# Patient Record
Sex: FEMALE | Race: BLACK OR AFRICAN AMERICAN | NOT HISPANIC OR LATINO | Employment: OTHER | URBAN - METROPOLITAN AREA
[De-identification: names, ages, dates, MRNs, and addresses within clinical notes are randomized per-mention and may not be internally consistent; named-entity substitution may affect disease eponyms.]

---

## 2023-11-16 PROBLEM — I10 ESSENTIAL HYPERTENSION: Status: ACTIVE | Noted: 2023-11-16

## 2023-11-16 PROBLEM — F32.A DEPRESSION: Status: ACTIVE | Noted: 2023-11-16

## 2023-11-16 PROBLEM — M79.673 FOOT PAIN: Status: ACTIVE | Noted: 2023-11-16

## 2023-11-16 PROBLEM — R10.9 ABDOMINAL PAIN: Status: ACTIVE | Noted: 2023-11-16

## 2023-11-16 PROBLEM — I50.9 CONGESTIVE HEART FAILURE: Status: ACTIVE | Noted: 2023-11-16

## 2023-11-16 PROBLEM — M79.10 MUSCLE PAIN: Status: ACTIVE | Noted: 2023-11-16

## 2024-01-11 ENCOUNTER — OFFICE VISIT (OUTPATIENT)
Dept: BARIATRICS/WEIGHT MGMT | Facility: CLINIC | Age: 54
End: 2024-01-11
Payer: MEDICAID

## 2024-01-11 VITALS
HEIGHT: 63 IN | SYSTOLIC BLOOD PRESSURE: 137 MMHG | TEMPERATURE: 97.3 F | BODY MASS INDEX: 51.91 KG/M2 | WEIGHT: 293 LBS | HEART RATE: 88 BPM | DIASTOLIC BLOOD PRESSURE: 91 MMHG

## 2024-01-11 DIAGNOSIS — R13.19 OTHER DYSPHAGIA: ICD-10-CM

## 2024-01-11 DIAGNOSIS — I50.20 SYSTOLIC CONGESTIVE HEART FAILURE, UNSPECIFIED HF CHRONICITY: ICD-10-CM

## 2024-01-11 DIAGNOSIS — I25.10 CORONARY ARTERY DISEASE INVOLVING NATIVE CORONARY ARTERY OF NATIVE HEART WITHOUT ANGINA PECTORIS: ICD-10-CM

## 2024-01-11 DIAGNOSIS — E11.69 DIABETES MELLITUS TYPE 2 IN OBESE: ICD-10-CM

## 2024-01-11 DIAGNOSIS — R11.10 REGURGITATION OF FOOD: Primary | ICD-10-CM

## 2024-01-11 DIAGNOSIS — E66.01 CLASS 3 SEVERE OBESITY DUE TO EXCESS CALORIES WITH SERIOUS COMORBIDITY AND BODY MASS INDEX (BMI) OF 60.0 TO 69.9 IN ADULT: ICD-10-CM

## 2024-01-11 DIAGNOSIS — I10 ESSENTIAL HYPERTENSION: ICD-10-CM

## 2024-01-11 DIAGNOSIS — Z98.84 HISTORY OF ADJUSTABLE GASTRIC BANDING: ICD-10-CM

## 2024-01-11 DIAGNOSIS — J43.8 OTHER EMPHYSEMA: ICD-10-CM

## 2024-01-11 DIAGNOSIS — E66.9 DIABETES MELLITUS TYPE 2 IN OBESE: ICD-10-CM

## 2024-01-11 DIAGNOSIS — K21.9 GASTROESOPHAGEAL REFLUX DISEASE, UNSPECIFIED WHETHER ESOPHAGITIS PRESENT: ICD-10-CM

## 2024-01-11 RX ORDER — HYDROCODONE BITARTRATE AND ACETAMINOPHEN 10; 325 MG/1; MG/1
1 TABLET ORAL
COMMUNITY
Start: 2024-01-04

## 2024-01-11 RX ORDER — BUMETANIDE 2 MG/1
4 TABLET ORAL DAILY
COMMUNITY
Start: 2023-04-12 | End: 2024-09-19

## 2024-01-11 RX ORDER — ALBUTEROL SULFATE 2.5 MG/3ML
2.5 SOLUTION RESPIRATORY (INHALATION) EVERY 4 HOURS PRN
COMMUNITY

## 2024-01-11 RX ORDER — INSULIN GLARGINE 100 [IU]/ML
45 INJECTION, SOLUTION SUBCUTANEOUS NIGHTLY
COMMUNITY

## 2024-01-11 RX ORDER — LOSARTAN POTASSIUM 100 MG/1
100 TABLET ORAL EVERY 24 HOURS
COMMUNITY

## 2024-01-11 RX ORDER — SODIUM CHLORIDE, SODIUM LACTATE, POTASSIUM CHLORIDE, CALCIUM CHLORIDE 600; 310; 30; 20 MG/100ML; MG/100ML; MG/100ML; MG/100ML
30 INJECTION, SOLUTION INTRAVENOUS CONTINUOUS
OUTPATIENT
Start: 2024-01-11

## 2024-01-11 NOTE — H&P
[facility] BARIATRIC SURGERY  [address] [facility] [address]  [facility] [address]  [phone]  [address] [address]  [phone]  Dept: [phone]  1/11/2024      Nicolasa Clemons.  [id number]  [id number]  1970  female      Chief Complaint   Patient presents with    Consult     LUANA - Band 2017         Post-Op Bariatric Surgery:   Nicolasa Clemons is status post Lapband procedure, performed on 2017 at [facility].     HPI:   Today's weight is (!) 167 kg (368 lb)  pounds, today's BMI is Body mass index is 65.2 kg/m².. The patient reports difficulty with solid foods.    Patient admits to nausea and dysphagia. The patient denies abdominal pain. The patientdoes have vomitng. The patientdoes have reflux.    Diet and Exercise: Diet history reviewed and discussed with the patient. Weight loss/gains to date discussed with the patient. She cannot tolerate solid protein.        The following portions of the patient's history were reviewed and updated as appropriate: allergies, current medications, past family history, past medical history, past social history, past surgical history, and problem list.    Vitals:    01/11/24 1443   BP: 137/91   Pulse: 88   Temp: 97.3 °F (36.3 °C)       Review of Systems   Constitutional:  Positive for fatigue.   Musculoskeletal:  Positive for arthralgias, back pain and gait problem.   Neurological:  Positive for weakness.   All other systems reviewed and are negative.      Physical Exam  Vitals reviewed.   HENT:      Head: Normocephalic and atraumatic.      Mouth/Throat:      Mouth: Mucous membranes are moist.      Pharynx: Oropharynx is clear.   Eyes:      General: No scleral icterus.     Extraocular Movements: Extraocular movements intact.      Conjunctiva/sclera: Conjunctivae normal.      Pupils: Pupils are equal, round, and reactive to light.   Neck:      Thyroid: No thyromegaly.   Cardiovascular:      Rate and  Rhythm: Normal rate.   Pulmonary:      Effort: Pulmonary effort is normal. No respiratory distress.      Breath sounds: Normal breath sounds. No stridor. No wheezing or rhonchi.   Abdominal:      General: Bowel sounds are normal.      Palpations: Abdomen is soft.      Tenderness: There is no abdominal tenderness. There is no right CVA tenderness, left CVA tenderness, guarding or rebound.      Hernia: No hernia is present.   Musculoskeletal:         General: Normal range of motion.      Cervical back: Normal range of motion and neck supple.   Lymphadenopathy:      Cervical: No cervical adenopathy.   Skin:     General: Skin is warm and dry.      Findings: No erythema.   Neurological:      Mental Status: She is alert and oriented to person, place, and time.      Motor: Weakness present.      Gait: Gait abnormal.   Psychiatric:         Mood and Affect: Mood normal.         Behavior: Behavior normal.         Thought Content: Thought content normal.         Judgment: Judgment normal.         Assessment: Post-operatively the patient status post lap band 2017 at [facility] where her greatest weight loss was around 60 pounds.  She has had trouble with the band over the past several years with regurgitating food, dysphagia with solids as well as heartburn.  She would like to have the band removed and interested in conversion to gastric bypass or gastric sleeve.  She has a history of smoking and states she quit about 3 years ago but states that she has smoked a few cigarettes in the past few months because of stress.  We went through all the different procedures and with her smoking history she feels like the gastric sleeve is the better procedure for her and she was concerned about vitamin deficiencies as well.  We will proceed with upper endoscopy to evaluate the band and proceed with insurance approval for band removal.  The risks and benefits of the procedure were discussed with the patient in detail and all questions  were answered.  Possibility of perforation, bleeding, aspiration, anoxic brain injury, respiratory and/or cardiac arrest and death were discussed.  Consent will be signed and witnessed.  She has a complicated cardiac history and plans on seeing her cardiologist on Monday and will get clearance for surgery.    Encounter Diagnoses   Name Primary?    Regurgitation of food Yes    Other dysphagia     Gastroesophageal reflux disease, unspecified whether esophagitis present     History of adjustable gastric banding     Essential hypertension     Diabetes mellitus type 2 in obese     Other emphysema     Coronary artery disease involving native coronary artery of native heart without angina pectoris     Systolic congestive heart failure, unspecified HF chronicity     Class 3 severe obesity due to excess calories with serious comorbidity and body mass index (BMI) of 60.0 to 69.9 in adult        Plan:    My impression is Nicolasa Clemons has a problem of her band as listed above.  I think she would benefit from band removal.  The risks and benefits of the procedure were discussed with the patient in detail and all questions were answered.  Possibility of open, bleeding, infection, bowel injury, deep venous thrombosis, pulmonary embolism, incisional hernias, renal failure, myocardial infarction, respiratory and cardiac arrest and death were discussed. Consent will be signed and witnessed.    Reviewed the importance of being able to tolerate dense/solid protein and vegetables for weight loss. Discussed eating foods that are easier to tolerate, softer foods, usually contribute to weight gain because they are higher calorie/carb and will not keep patient full for the recommended 3-4 hours.      She should follow-up after band removal.      EGD discussed with patient and all questions answered    Approximately 45minutes was spent reviewing records and spending time with the patient and over 30 minutes spent counseling.    Activity  restrictions: None.   Instructions / Recommendations: dietary counseling recommended, recommended a daily protein intake of  grams, patient was advised that the lap band system works best when consuming solid foods, vitamin supplement(s) recommended, recommended exercising at least 150 minutes per week, behavior modifications recommended and instructed to call the office for concerns, questions, or problems.

## 2024-01-11 NOTE — H&P (VIEW-ONLY)
[facility] BARIATRIC SURGERY  [address] [facility] [address]  [facility] [address]  [phone]  [address] [address]  [phone]  Dept: [phone]  1/11/2024      Nicolasa Clemons.  [id number]  [id number]  1970  female      Chief Complaint   Patient presents with    Consult     LUANA - Band 2017         Post-Op Bariatric Surgery:   Nicolasa Clemons is status post Lapband procedure, performed on 2017 at [facility].     HPI:   Today's weight is (!) 167 kg (368 lb)  pounds, today's BMI is Body mass index is 65.2 kg/m².. The patient reports difficulty with solid foods.    Patient admits to nausea and dysphagia. The patient denies abdominal pain. The patientdoes have vomitng. The patientdoes have reflux.    Diet and Exercise: Diet history reviewed and discussed with the patient. Weight loss/gains to date discussed with the patient. She cannot tolerate solid protein.        The following portions of the patient's history were reviewed and updated as appropriate: allergies, current medications, past family history, past medical history, past social history, past surgical history, and problem list.    Vitals:    01/11/24 1443   BP: 137/91   Pulse: 88   Temp: 97.3 °F (36.3 °C)       Review of Systems   Constitutional:  Positive for fatigue.   Musculoskeletal:  Positive for arthralgias, back pain and gait problem.   Neurological:  Positive for weakness.   All other systems reviewed and are negative.      Physical Exam  Vitals reviewed.   HENT:      Head: Normocephalic and atraumatic.      Mouth/Throat:      Mouth: Mucous membranes are moist.      Pharynx: Oropharynx is clear.   Eyes:      General: No scleral icterus.     Extraocular Movements: Extraocular movements intact.      Conjunctiva/sclera: Conjunctivae normal.      Pupils: Pupils are equal, round, and reactive to light.   Neck:      Thyroid: No thyromegaly.   Cardiovascular:      Rate and  Rhythm: Normal rate.   Pulmonary:      Effort: Pulmonary effort is normal. No respiratory distress.      Breath sounds: Normal breath sounds. No stridor. No wheezing or rhonchi.   Abdominal:      General: Bowel sounds are normal.      Palpations: Abdomen is soft.      Tenderness: There is no abdominal tenderness. There is no right CVA tenderness, left CVA tenderness, guarding or rebound.      Hernia: No hernia is present.   Musculoskeletal:         General: Normal range of motion.      Cervical back: Normal range of motion and neck supple.   Lymphadenopathy:      Cervical: No cervical adenopathy.   Skin:     General: Skin is warm and dry.      Findings: No erythema.   Neurological:      Mental Status: She is alert and oriented to person, place, and time.      Motor: Weakness present.      Gait: Gait abnormal.   Psychiatric:         Mood and Affect: Mood normal.         Behavior: Behavior normal.         Thought Content: Thought content normal.         Judgment: Judgment normal.         Assessment: Post-operatively the patient status post lap band 2017 at [facility] where her greatest weight loss was around 60 pounds.  She has had trouble with the band over the past several years with regurgitating food, dysphagia with solids as well as heartburn.  She would like to have the band removed and interested in conversion to gastric bypass or gastric sleeve.  She has a history of smoking and states she quit about 3 years ago but states that she has smoked a few cigarettes in the past few months because of stress.  We went through all the different procedures and with her smoking history she feels like the gastric sleeve is the better procedure for her and she was concerned about vitamin deficiencies as well.  We will proceed with upper endoscopy to evaluate the band and proceed with insurance approval for band removal.  The risks and benefits of the procedure were discussed with the patient in detail and all questions  were answered.  Possibility of perforation, bleeding, aspiration, anoxic brain injury, respiratory and/or cardiac arrest and death were discussed.  Consent will be signed and witnessed.  She has a complicated cardiac history and plans on seeing her cardiologist on Monday and will get clearance for surgery.    Encounter Diagnoses   Name Primary?    Regurgitation of food Yes    Other dysphagia     Gastroesophageal reflux disease, unspecified whether esophagitis present     History of adjustable gastric banding     Essential hypertension     Diabetes mellitus type 2 in obese     Other emphysema     Coronary artery disease involving native coronary artery of native heart without angina pectoris     Systolic congestive heart failure, unspecified HF chronicity     Class 3 severe obesity due to excess calories with serious comorbidity and body mass index (BMI) of 60.0 to 69.9 in adult        Plan:    My impression is Nicolasa Clemons has a problem of her band as listed above.  I think she would benefit from band removal.  The risks and benefits of the procedure were discussed with the patient in detail and all questions were answered.  Possibility of open, bleeding, infection, bowel injury, deep venous thrombosis, pulmonary embolism, incisional hernias, renal failure, myocardial infarction, respiratory and cardiac arrest and death were discussed. Consent will be signed and witnessed.    Reviewed the importance of being able to tolerate dense/solid protein and vegetables for weight loss. Discussed eating foods that are easier to tolerate, softer foods, usually contribute to weight gain because they are higher calorie/carb and will not keep patient full for the recommended 3-4 hours.      She should follow-up after band removal.      EGD discussed with patient and all questions answered    Approximately 45minutes was spent reviewing records and spending time with the patient and over 30 minutes spent counseling.    Activity  restrictions: None.   Instructions / Recommendations: dietary counseling recommended, recommended a daily protein intake of  grams, patient was advised that the lap band system works best when consuming solid foods, vitamin supplement(s) recommended, recommended exercising at least 150 minutes per week, behavior modifications recommended and instructed to call the office for concerns, questions, or problems.

## 2024-01-30 ENCOUNTER — ANESTHESIA EVENT (OUTPATIENT)
Dept: GASTROENTEROLOGY | Facility: HOSPITAL | Age: 54
End: 2024-01-30
Payer: MEDICAID

## 2024-01-30 ENCOUNTER — ANESTHESIA (OUTPATIENT)
Dept: GASTROENTEROLOGY | Facility: HOSPITAL | Age: 54
End: 2024-01-30
Payer: MEDICAID

## 2024-01-30 ENCOUNTER — HOSPITAL ENCOUNTER (OUTPATIENT)
Facility: HOSPITAL | Age: 54
Setting detail: HOSPITAL OUTPATIENT SURGERY
Discharge: HOME | End: 2024-01-30
Attending: SURGERY | Admitting: SURGERY
Payer: MEDICAID

## 2024-01-30 VITALS
BODY MASS INDEX: 51.91 KG/M2 | RESPIRATION RATE: 20 BRPM | DIASTOLIC BLOOD PRESSURE: 89 MMHG | WEIGHT: 293 LBS | HEART RATE: 82 BPM | OXYGEN SATURATION: 94 % | SYSTOLIC BLOOD PRESSURE: 137 MMHG | HEIGHT: 63 IN

## 2024-01-30 DIAGNOSIS — E11.69 DIABETES MELLITUS TYPE 2 IN OBESE: ICD-10-CM

## 2024-01-30 DIAGNOSIS — J43.8 OTHER EMPHYSEMA: ICD-10-CM

## 2024-01-30 DIAGNOSIS — R11.10 REGURGITATION OF FOOD: ICD-10-CM

## 2024-01-30 DIAGNOSIS — E66.9 DIABETES MELLITUS TYPE 2 IN OBESE: ICD-10-CM

## 2024-01-30 DIAGNOSIS — Z98.84 HISTORY OF ADJUSTABLE GASTRIC BANDING: ICD-10-CM

## 2024-01-30 DIAGNOSIS — K21.9 GASTROESOPHAGEAL REFLUX DISEASE, UNSPECIFIED WHETHER ESOPHAGITIS PRESENT: ICD-10-CM

## 2024-01-30 DIAGNOSIS — I10 ESSENTIAL HYPERTENSION: ICD-10-CM

## 2024-01-30 DIAGNOSIS — E66.01 CLASS 3 SEVERE OBESITY DUE TO EXCESS CALORIES WITH SERIOUS COMORBIDITY AND BODY MASS INDEX (BMI) OF 60.0 TO 69.9 IN ADULT: ICD-10-CM

## 2024-01-30 DIAGNOSIS — R13.19 OTHER DYSPHAGIA: ICD-10-CM

## 2024-01-30 DIAGNOSIS — I50.20 SYSTOLIC CONGESTIVE HEART FAILURE, UNSPECIFIED HF CHRONICITY: ICD-10-CM

## 2024-01-30 DIAGNOSIS — I25.10 CORONARY ARTERY DISEASE INVOLVING NATIVE CORONARY ARTERY OF NATIVE HEART WITHOUT ANGINA PECTORIS: ICD-10-CM

## 2024-01-30 PROBLEM — K29.00 ACUTE SUPERFICIAL GASTRITIS WITHOUT HEMORRHAGE: Status: ACTIVE | Noted: 2024-01-30

## 2024-01-30 PROBLEM — K95.09 GASTRIC BAND SLIPPAGE: Status: ACTIVE | Noted: 2024-01-30

## 2024-01-30 LAB — GLUCOSE BLDC GLUCOMTR-MCNC: 154 MG/DL (ref 70–130)

## 2024-01-30 PROCEDURE — 82948 REAGENT STRIP/BLOOD GLUCOSE: CPT

## 2024-01-30 PROCEDURE — 87081 CULTURE SCREEN ONLY: CPT | Performed by: SURGERY

## 2024-01-30 PROCEDURE — 25810000003 LACTATED RINGERS PER 1000 ML: Performed by: SURGERY

## 2024-01-30 PROCEDURE — 25010000002 PROPOFOL 10 MG/ML EMULSION: Performed by: NURSE ANESTHETIST, CERTIFIED REGISTERED

## 2024-01-30 RX ORDER — SODIUM CHLORIDE, SODIUM LACTATE, POTASSIUM CHLORIDE, CALCIUM CHLORIDE 600; 310; 30; 20 MG/100ML; MG/100ML; MG/100ML; MG/100ML
1000 INJECTION, SOLUTION INTRAVENOUS CONTINUOUS
Status: DISCONTINUED | OUTPATIENT
Start: 2024-01-30 | End: 2024-01-30 | Stop reason: HOSPADM

## 2024-01-30 RX ORDER — PANTOPRAZOLE SODIUM 40 MG/1
40 TABLET, DELAYED RELEASE ORAL DAILY
Qty: 30 TABLET | Refills: 5 | Status: SHIPPED | OUTPATIENT
Start: 2024-01-30

## 2024-01-30 RX ORDER — PROPOFOL 10 MG/ML
VIAL (ML) INTRAVENOUS AS NEEDED
Status: DISCONTINUED | OUTPATIENT
Start: 2024-01-30 | End: 2024-01-30 | Stop reason: SURG

## 2024-01-30 RX ORDER — SODIUM CHLORIDE, SODIUM LACTATE, POTASSIUM CHLORIDE, CALCIUM CHLORIDE 600; 310; 30; 20 MG/100ML; MG/100ML; MG/100ML; MG/100ML
30 INJECTION, SOLUTION INTRAVENOUS CONTINUOUS
Status: DISCONTINUED | OUTPATIENT
Start: 2024-01-30 | End: 2024-01-30 | Stop reason: HOSPADM

## 2024-01-30 RX ORDER — LIDOCAINE HYDROCHLORIDE 20 MG/ML
INJECTION, SOLUTION INFILTRATION; PERINEURAL AS NEEDED
Status: DISCONTINUED | OUTPATIENT
Start: 2024-01-30 | End: 2024-01-30 | Stop reason: SURG

## 2024-01-30 RX ORDER — SUCRALFATE 1 G/1
1 TABLET ORAL 3 TIMES DAILY
Qty: 90 TABLET | Refills: 1 | Status: SHIPPED | OUTPATIENT
Start: 2024-01-30

## 2024-01-30 RX ADMIN — LIDOCAINE HYDROCHLORIDE 60 MG: 20 INJECTION, SOLUTION INFILTRATION; PERINEURAL at 07:07

## 2024-01-30 RX ADMIN — SODIUM CHLORIDE, POTASSIUM CHLORIDE, SODIUM LACTATE AND CALCIUM CHLORIDE 1000 ML: 600; 310; 30; 20 INJECTION, SOLUTION INTRAVENOUS at 06:50

## 2024-01-30 RX ADMIN — PROPOFOL 100 MG: 10 INJECTION, EMULSION INTRAVENOUS at 07:07

## 2024-01-30 NOTE — DISCHARGE INSTRUCTIONS
For the next 24 hours patient needs to be with a responsible adult.    For 24 hours DO NOT drive, operate machinery, appliances, drink alcohol, make important decisions or sign legal documents.    Start with a light or bland diet if you are feeling sick to your stomach otherwise advance to regular diet as tolerated.    Follow recommendations on procedure report if provided by your doctor.    Call Dr Marte for problems [phone]    Stay away from NSAIDS    Problems may include but not limited to: large amounts of bleeding, trouble breathing, repeated vomiting, severe unrelieved pain, fever or chills.

## 2024-01-30 NOTE — OP NOTE
Surgeon: Jose Marte Jr., M.D.    Preoperative Diagnosis: #1 dysphagia #2 status post lap band    Postoperative Diagnosis: #1 gastritis #2 proximal band slip    Procedure Performed: Transoral esophagogastroduodenoscopy with gastric antral biopsies    Indications: 53-year-old female status post lap band at [facility] with complaints of dysphagia with solids.    Procedure:     The procedure, indications, preparation and potential complications were explained to the patient, who indicated understanding and signed the corresponding consent forms.  The patient was identified, taken to the endoscopy suite, and placed on the left side down decubitus position.  The patient underwent a MAC anesthesia and was appropriately monitored through the case by the anesthesia personnel with continuous pulse oximetry, blood pressure, and cardiac monitoring.  A bite block was placed.  After adequate IV sedation and using a transoral technique a lubed flexible endoscope was placed in the hypopharynx and advanced to the second portion of the duodenum without difficulty. The scope was then withdrawn back into the antrum of the stomach.  Cold forcep biopsies of the antrum were taken to rule out Helicobacter pylori.  The scope was retroflexed noting the body, fundus and cardia.  The scope was then withdrawn back into the esophagus after decompressing the stomach.  The Z line was noted and GE junction measured from the incisors.  The scope was then completely withdrawn.  The patient tolerated the procedure well and left the endoscopy suite in stable condition.  The findings are listed below.    Duodenum: Unremarkable  Antrum: Erythema throughout antrum  Body/Fundus: Patchy erythema.  Indentation of band noted without evidence of erosion  Cardia: Minimal gastric pouch consistent with proximal band slip  Esophagus: Z-line regular    Recommendations:     Will proceed with insurance approval for band removal.  Stop any NSAIDs and start on  PPI and Carafate

## 2024-01-30 NOTE — ANESTHESIA POSTPROCEDURE EVALUATION
Patient: Nicolasa Clemons    Procedure Summary       Date: 01/30/24 Room / Location:  [facility] /  [facility]    Anesthesia Start: 0701 Anesthesia Stop: 0722    Procedure: ESOPHAGOGASTRODUODENOSCOPY WITH BIOPSY (Esophagus) Diagnosis:       Regurgitation of food      Other dysphagia      Gastroesophageal reflux disease, unspecified whether esophagitis present      History of adjustable gastric banding      Essential hypertension      Diabetes mellitus type 2 in obese      Other emphysema      Coronary artery disease involving native coronary artery of native heart without angina pectoris      Systolic congestive heart failure, unspecified HF chronicity      Class 3 severe obesity due to excess calories with serious comorbidity and body mass index (BMI) of 60.0 to 69.9 in adult      (Regurgitation of food [R11.10])      (Other dysphagia [R13.19])      (Gastroesophageal reflux disease, unspecified whether esophagitis present [K21.9])      (History of adjustable gastric banding [Z98.84])      (Essential hypertension [I10])      (Diabetes mellitus type 2 in obese [E11.69, E66.9])      (Other emphysema [J43.8])      (Coronary artery disease involving native coronary artery of native heart without angina pectoris [I25.10])      (Systolic congestive heart failure, unspecified HF chronicity [I50.20])      (Class 3 severe obesity due to excess calories with serious comorbidity and body mass index (BMI) of 60.0 to 69.9 in adult [E66.01, Z68.44])    Surgeons: Jose Marte Jr., MD Provider: Jose Bull MD    Anesthesia Type: MAC ASA Status: 4            Anesthesia Type: MAC    Vitals  Vitals Value Taken Time   /99 01/30/24 0731   Temp     Pulse 83 01/30/24 0743   Resp 20 01/30/24 0731   SpO2 96 % 01/30/24 0743   Vitals shown include unfiled device data.        Post Anesthesia Care and Evaluation    Patient location during evaluation: PACU  Patient participation: complete - patient  participated  Level of consciousness: awake and alert  Pain management: adequate    Airway patency: patent  Anesthetic complications: No anesthetic complications    Cardiovascular status: acceptable  Respiratory status: acceptable  Hydration status: acceptable    Comments: --------------------            01/30/24               0731     --------------------   BP:       120/99     Pulse:      82       Resp:       20       SpO2:      93%      --------------------

## 2024-01-30 NOTE — ANESTHESIA PREPROCEDURE EVALUATION
Anesthesia Evaluation     Patient summary reviewed and Nursing notes reviewed                Airway   Mallampati: III  TM distance: >3 FB  Dental      Pulmonary - negative pulmonary ROS   (+) a smoker Former, COPD,home oxygen  Cardiovascular - negative cardio ROS    ECG reviewed  Patient on routine beta blocker  Rhythm: regular  Rate: normal    (+) hypertension, past MI , CAD, CHF       Neuro/Psych- negative ROS  (+) psychiatric history Anxiety and Depression  GI/Hepatic/Renal/Endo - negative ROS   (+) morbid obesity, GERD, diabetes mellitus type 2 using insulin    Musculoskeletal (-) negative ROS    Abdominal    Substance History - negative use     OB/GYN negative ob/gyn ROS         Other                    Anesthesia Plan    ASA 4     MAC     (I have reviewed the patient's history with the patient and the chart, including all pertinent laboratory results and imaging. I have explained the risks of anesthesia including but not limited to dental damage, corneal abrasion, nerve injury, MI, stroke, and death. Questions asked and answered. Anesthetic plan discussed with patient and team as indicated. Patient expressed understanding of the above.    )  intravenous induction     Anesthetic plan, risks, benefits, and alternatives have been provided, discussed and informed consent has been obtained with: patient.    Plan discussed with CRNA.    CODE STATUS:

## 2024-01-31 LAB — UREASE TISS QL: NEGATIVE

## 2024-04-06 ENCOUNTER — PREP FOR SURGERY (OUTPATIENT)
Dept: OTHER | Facility: HOSPITAL | Age: 54
End: 2024-04-06
Payer: MEDICAID

## 2024-04-06 DIAGNOSIS — K95.09 GASTRIC BAND MALFUNCTION: Primary | ICD-10-CM

## 2024-04-06 RX ORDER — PANTOPRAZOLE SODIUM 40 MG/10ML
40 INJECTION, POWDER, LYOPHILIZED, FOR SOLUTION INTRAVENOUS ONCE
OUTPATIENT
Start: 2024-04-06 | End: 2024-04-06

## 2024-04-06 RX ORDER — SODIUM CHLORIDE 9 MG/ML
40 INJECTION, SOLUTION INTRAVENOUS AS NEEDED
OUTPATIENT
Start: 2024-04-06

## 2024-04-06 RX ORDER — CHLORHEXIDINE GLUCONATE ORAL RINSE 1.2 MG/ML
15 SOLUTION DENTAL
OUTPATIENT
Start: 2024-04-06 | End: 2024-04-06

## 2024-04-06 RX ORDER — SCOLOPAMINE TRANSDERMAL SYSTEM 1 MG/1
1 PATCH, EXTENDED RELEASE TRANSDERMAL CONTINUOUS
OUTPATIENT
Start: 2024-04-06 | End: 2024-04-09

## 2024-04-06 RX ORDER — SODIUM CHLORIDE, SODIUM LACTATE, POTASSIUM CHLORIDE, CALCIUM CHLORIDE 600; 310; 30; 20 MG/100ML; MG/100ML; MG/100ML; MG/100ML
100 INJECTION, SOLUTION INTRAVENOUS CONTINUOUS
OUTPATIENT
Start: 2024-04-06

## 2024-04-06 RX ORDER — METOCLOPRAMIDE HYDROCHLORIDE 5 MG/ML
10 INJECTION INTRAMUSCULAR; INTRAVENOUS ONCE
OUTPATIENT
Start: 2024-04-06 | End: 2024-04-06

## 2024-04-06 RX ORDER — SODIUM CHLORIDE 0.9 % (FLUSH) 0.9 %
1-10 SYRINGE (ML) INJECTION AS NEEDED
OUTPATIENT
Start: 2024-04-06

## 2024-04-06 RX ORDER — SODIUM CHLORIDE 0.9 % (FLUSH) 0.9 %
10 SYRINGE (ML) INJECTION EVERY 12 HOURS SCHEDULED
OUTPATIENT
Start: 2024-04-06

## 2024-04-06 RX ORDER — GABAPENTIN 300 MG/1
300 CAPSULE ORAL ONCE
OUTPATIENT
Start: 2024-04-06 | End: 2024-04-06

## 2024-04-08 PROBLEM — K95.09 GASTRIC BAND MALFUNCTION: Status: ACTIVE | Noted: 2024-04-06

## 2024-04-09 ENCOUNTER — TELEPHONE (OUTPATIENT)
Dept: BARIATRICS/WEIGHT MGMT | Facility: CLINIC | Age: 54
End: 2024-04-09
Payer: MEDICAID

## 2024-04-09 NOTE — TELEPHONE ENCOUNTER
Spoke to patient. Gave her pre admission testing appt date and time as well as surgery arrival time.

## 2024-04-15 ENCOUNTER — PRE-ADMISSION TESTING (OUTPATIENT)
Dept: PREADMISSION TESTING | Facility: HOSPITAL | Age: 54
End: 2024-04-15
Payer: MEDICAID

## 2024-04-15 VITALS
SYSTOLIC BLOOD PRESSURE: 159 MMHG | RESPIRATION RATE: 18 BRPM | HEIGHT: 63 IN | BODY MASS INDEX: 59.7 KG/M2 | TEMPERATURE: 98.3 F | HEART RATE: 87 BPM | DIASTOLIC BLOOD PRESSURE: 96 MMHG | OXYGEN SATURATION: 95 %

## 2024-04-15 DIAGNOSIS — K95.09 GASTRIC BAND MALFUNCTION: ICD-10-CM

## 2024-04-15 LAB
ALBUMIN SERPL-MCNC: 3.8 G/DL (ref 3.5–5.2)
ALBUMIN/GLOB SERPL: 1.1 G/DL
ALP SERPL-CCNC: 114 U/L (ref 39–117)
ALT SERPL W P-5'-P-CCNC: 12 U/L (ref 1–33)
ANION GAP SERPL CALCULATED.3IONS-SCNC: 10 MMOL/L (ref 5–15)
AST SERPL-CCNC: <5 U/L (ref 1–32)
BILIRUB SERPL-MCNC: 0.6 MG/DL (ref 0–1.2)
BUN SERPL-MCNC: 14 MG/DL (ref 6–20)
BUN/CREAT SERPL: 11.6 (ref 7–25)
CALCIUM SPEC-SCNC: 8.7 MG/DL (ref 8.6–10.5)
CHLORIDE SERPL-SCNC: 97 MMOL/L (ref 98–107)
CO2 SERPL-SCNC: 29 MMOL/L (ref 22–29)
CREAT SERPL-MCNC: 1.21 MG/DL (ref 0.57–1)
DEPRECATED RDW RBC AUTO: 47.1 FL (ref 37–54)
EGFRCR SERPLBLD CKD-EPI 2021: 54.4 ML/MIN/1.73
ERYTHROCYTE [DISTWIDTH] IN BLOOD BY AUTOMATED COUNT: 16.1 % (ref 12.3–15.4)
GLOBULIN UR ELPH-MCNC: 3.4 GM/DL
GLUCOSE SERPL-MCNC: 214 MG/DL (ref 65–99)
HCG SERPL QL: NEGATIVE
HCT VFR BLD AUTO: 40.7 % (ref 34–46.6)
HGB BLD-MCNC: 12.1 G/DL (ref 12–15.9)
MCH RBC QN AUTO: 24.2 PG (ref 26.6–33)
MCHC RBC AUTO-ENTMCNC: 29.7 G/DL (ref 31.5–35.7)
MCV RBC AUTO: 81.4 FL (ref 79–97)
PLATELET # BLD AUTO: 240 10*3/MM3 (ref 140–450)
PMV BLD AUTO: 10.7 FL (ref 6–12)
POTASSIUM SERPL-SCNC: 4.2 MMOL/L (ref 3.5–5.2)
PROT SERPL-MCNC: 7.2 G/DL (ref 6–8.5)
QT INTERVAL: 399 MS
QTC INTERVAL: 497 MS
RBC # BLD AUTO: 5 10*6/MM3 (ref 3.77–5.28)
SODIUM SERPL-SCNC: 136 MMOL/L (ref 136–145)
WBC NRBC COR # BLD AUTO: 5.69 10*3/MM3 (ref 3.4–10.8)

## 2024-04-15 PROCEDURE — 85027 COMPLETE CBC AUTOMATED: CPT

## 2024-04-15 PROCEDURE — 80053 COMPREHEN METABOLIC PANEL: CPT

## 2024-04-15 PROCEDURE — 84703 CHORIONIC GONADOTROPIN ASSAY: CPT

## 2024-04-15 PROCEDURE — 36415 COLL VENOUS BLD VENIPUNCTURE: CPT

## 2024-04-15 PROCEDURE — 93005 ELECTROCARDIOGRAM TRACING: CPT

## 2024-04-15 RX ORDER — SUCRALFATE 1 G/1
1 TABLET ORAL 4 TIMES DAILY
COMMUNITY

## 2024-04-15 NOTE — DISCHARGE INSTRUCTIONS
Take only the following medications the morning of surgery:  METOPROLOL      Do not take Bariatric Vitamins, Folic Acid, Actigall (if applicable) or Lovenox Injections (if applicable) the morning of surgery.  If you have a history of blood clots or have a BMI greater than 50, Dr. Marte may order Lovenox for after surgery. Do not take Lovenox blood thinner before surgery.      General Instructions:    Liquids only the day before surgery.  Drink one 20 ounce Gatorade G2 the evening before surgery.  Nothing red in color.   The morning of surgery have another 20 ounce Gatorade G2.  Again, nothing red in color.  Your drink must be completed 2 hours before your arrival time.   Patients who avoid smoking, chewing tobacco and alcohol for 4 weeks prior to surgery have a reduced risk of post-operative complications.  Quit smoking as many days before surgery as you can.  Do not smoke, use chewing tobacco or drink alcohol the day of surgery.   Bring any papers given to you in the doctor's office.  Wear clean comfortable clothes.  Do not wear contact lenses, false eyelashes or make-up.  Bring a case for your glasses.   Bring crutches or walker if applicable.  Remove all piercings.  Leave jewelry and any other valuables at home.  Remove fingernail polish, gel overlays or any artificial nails.  Hair extensions with metal clips must be removed prior to surgery.  The Pre-Admission Testing nurse will instruct you to bring medications if unable to obtain an accurate list in Pre-Admission Testing.    If you were given a blood bank ID arm band remember to bring it with you the day of surgery.    Preventing a Surgical Site Infection:  For 2 to 3 days before surgery, avoid shaving with a razor because the razor can irritate skin and make it easier to develop an infection.    Any areas of open skin can increase the risk of a post-operative wound infection by allowing bacteria to enter and travel throughout the body.  Notify your surgeon  if you have any skin wounds / rashes even if it is not near the expected surgical site.  The area will need assessed to determine if surgery should be delayed until it is healed.  2 days prior to surgery, take a shower using a fresh bar of anti-bacterial soap (such as Dial).  Use a clean washcloth and dry with a clean towel.    The day prior to surgery, take a shower using a fresh bar of anti-bacterial soap (such as Dial).  Use a clean washcloth and dry with a clean towel.  Sleep in a clean bed with clean clothing.  Do not allow pets to sleep with you.  The morning of surgery shower using a fresh bar of anti-bacterial soap (such as Dial).  Use a clean washcloth and dry with a clean towel.  Follow the Chlorhexidine instructions below.    CHLORHEXIDINE CLOTH INSTRUCTIONS  The morning of surgery follow these instructions using the Chlorhexidine cloths you've been given.  These steps reduce bacteria on the body.  Do not use the cloths near your eyes, ears mouth, genitalia or on open wounds.  Throw the cloths away after use but do not try to flush them down a toilet.    Open and remove one cloth at a time from the package.    Leave the cloth unfolded and begin the bathing.  Massage the skin with the cloths using gentle pressure to remove bacteria.  Do not scrub harshly.   Follow the steps below with one 2% CHG cloth per area (6 total cloths).  One cloth for neck, shoulders and chest.  One cloth for both arms, hands, fingers and underarms (do underarms last).  One cloth for the abdomen followed by groin.  One cloth for right leg and foot including between the toes.  One cloth for left leg and foot including between the toes.  The last cloth is to be used for the back of the neck, back and buttocks.    Allow the CHG to air dry 3 minutes on the skin which will give it time to work and decrease the chance of irritation.  The skin may feel sticky until it is dry.  Do not rinse with water or any other liquid or you will lose  the beneficial effects of the CHG.  If mild skin irritation occurs, do rinse the skin to remove the CHG.  Report this to the nurse at time of admission.  Do not apply lotions, creams, ointments, deodorants or perfumes after using the clothes. Dress in clean clothes before coming to the hospital.    Ask your surgeon if you will be receiving antibiotics prior to surgery.  Make sure you, your family, and all healthcare providers clean their hands with soap and water or an alcohol based hand  before caring for you or your wound.      Day of surgery:  Your arrival time is approximately two hours before your scheduled surgery time.  Upon arrival, a Pre-op nurse and Anesthesiologist will review your health history, obtain vital signs, and answer questions you may have.  A Pre-op nurse will start an IV and you may receive medication in preparation for surgery, including something to help you relax.  If applicable, we do ask that you have your C-PAP/BI-PAP machine available. It can be utilized the night of surgery.     Please be aware that surgery does come with discomfort.  We want to make every effort to control your discomfort so please discuss any uncontrolled symptoms with your nurse.   Your doctor will most likely have prescribed pain medications.      If you are going home after surgery you will receive individualized written care instructions before being discharged.  A responsible adult must drive you to and from the hospital on the day of your surgery and ideally stay with you through the night.   Discharge prescriptions can be filled by the hospital pharmacy during regular pharmacy hours.  If you are having surgery late in the day/evening your prescription may be e-prescribed to your pharmacy.  Please verify your pharmacy hours or chose a 24 hour pharmacy to avoid not having access to your prescription because your pharmacy has closed for the day.    If you are staying overnight following surgery, you  will be transported to your hospital room following the recovery period.  [facility] has all private rooms.    If you have any questions please call Pre-Admission Testing at [phone].  Deductibles and co-payments are collected on the day of service. Please be prepared to pay the required co-pay, deductible or deposit on the day of service as defined by your plan.    Call your surgeon immediately if you experience any of the following symptoms:  Sore Throat  Shortness of Breath or difficulty breathing  Cough  Chills  Body soreness or muscle pain  Headache  Fever  New loss of taste or smell  Do not arrive for your surgery ill.  Your procedure will need to be rescheduled to another time.  You will need to call your physician before the day of surgery to avoid any unnecessary exposure to hospital staff as well as other patients.

## 2024-04-17 ENCOUNTER — ANESTHESIA (OUTPATIENT)
Dept: PERIOP | Facility: HOSPITAL | Age: 54
End: 2024-04-17
Payer: MEDICAID

## 2024-04-17 ENCOUNTER — ANESTHESIA EVENT (OUTPATIENT)
Dept: PERIOP | Facility: HOSPITAL | Age: 54
End: 2024-04-17
Payer: MEDICAID

## 2024-04-17 ENCOUNTER — HOSPITAL ENCOUNTER (OUTPATIENT)
Facility: HOSPITAL | Age: 54
Setting detail: HOSPITAL OUTPATIENT SURGERY
Discharge: HOME | End: 2024-04-17
Attending: SURGERY | Admitting: SURGERY
Payer: MEDICAID

## 2024-04-17 ENCOUNTER — APPOINTMENT (OUTPATIENT)
Dept: GENERAL RADIOLOGY | Facility: HOSPITAL | Age: 54
End: 2024-04-17
Payer: MEDICAID

## 2024-04-17 VITALS
WEIGHT: 293 LBS | BODY MASS INDEX: 60.14 KG/M2 | DIASTOLIC BLOOD PRESSURE: 82 MMHG | TEMPERATURE: 97.4 F | OXYGEN SATURATION: 96 % | SYSTOLIC BLOOD PRESSURE: 147 MMHG | HEART RATE: 86 BPM | RESPIRATION RATE: 22 BRPM

## 2024-04-17 DIAGNOSIS — R11.10 REGURGITATION OF FOOD: ICD-10-CM

## 2024-04-17 DIAGNOSIS — I10 ESSENTIAL HYPERTENSION: ICD-10-CM

## 2024-04-17 DIAGNOSIS — R10.10 PAIN OF UPPER ABDOMEN: ICD-10-CM

## 2024-04-17 DIAGNOSIS — K95.09 GASTRIC BAND SLIPPAGE: ICD-10-CM

## 2024-04-17 DIAGNOSIS — M79.10 MUSCLE PAIN: ICD-10-CM

## 2024-04-17 DIAGNOSIS — I25.10 CORONARY ARTERY DISEASE INVOLVING NATIVE CORONARY ARTERY OF NATIVE HEART WITHOUT ANGINA PECTORIS: ICD-10-CM

## 2024-04-17 DIAGNOSIS — E66.9 TYPE 2 DIABETES MELLITUS WITH OBESITY: ICD-10-CM

## 2024-04-17 DIAGNOSIS — E11.69 TYPE 2 DIABETES MELLITUS WITH OBESITY: ICD-10-CM

## 2024-04-17 DIAGNOSIS — Z98.84 HISTORY OF ADJUSTABLE GASTRIC BANDING: ICD-10-CM

## 2024-04-17 DIAGNOSIS — J43.8 OTHER EMPHYSEMA: ICD-10-CM

## 2024-04-17 DIAGNOSIS — E66.01 CLASS 3 SEVERE OBESITY DUE TO EXCESS CALORIES WITH SERIOUS COMORBIDITY AND BODY MASS INDEX (BMI) OF 60.0 TO 69.9 IN ADULT: ICD-10-CM

## 2024-04-17 DIAGNOSIS — K21.9 GASTROESOPHAGEAL REFLUX DISEASE, UNSPECIFIED WHETHER ESOPHAGITIS PRESENT: ICD-10-CM

## 2024-04-17 DIAGNOSIS — I50.9 CONGESTIVE HEART FAILURE, UNSPECIFIED HF CHRONICITY, UNSPECIFIED HEART FAILURE TYPE: ICD-10-CM

## 2024-04-17 DIAGNOSIS — R13.19 OTHER DYSPHAGIA: ICD-10-CM

## 2024-04-17 DIAGNOSIS — K95.09 GASTRIC BAND MALFUNCTION: ICD-10-CM

## 2024-04-17 DIAGNOSIS — K29.00 ACUTE SUPERFICIAL GASTRITIS WITHOUT HEMORRHAGE: Primary | ICD-10-CM

## 2024-04-17 LAB
GLUCOSE BLDC GLUCOMTR-MCNC: 255 MG/DL (ref 70–130)
GLUCOSE BLDC GLUCOMTR-MCNC: 261 MG/DL (ref 70–130)
GLUCOSE BLDC GLUCOMTR-MCNC: 291 MG/DL (ref 70–130)

## 2024-04-17 PROCEDURE — 25010000002 HYDROMORPHONE PER 4 MG: Performed by: NURSE ANESTHETIST, CERTIFIED REGISTERED

## 2024-04-17 PROCEDURE — 25010000002 METOCLOPRAMIDE PER 10 MG: Performed by: SURGERY

## 2024-04-17 PROCEDURE — 82948 REAGENT STRIP/BLOOD GLUCOSE: CPT

## 2024-04-17 PROCEDURE — 25010000002 PROPOFOL 10 MG/ML EMULSION: Performed by: NURSE ANESTHETIST, CERTIFIED REGISTERED

## 2024-04-17 PROCEDURE — 25010000002 DEXAMETHASONE SODIUM PHOSPHATE 20 MG/5ML SOLUTION: Performed by: NURSE ANESTHETIST, CERTIFIED REGISTERED

## 2024-04-17 PROCEDURE — 25010000002 ROPIVACAINE PER 1 MG: Performed by: SURGERY

## 2024-04-17 PROCEDURE — 25010000002 SUGAMMADEX 200 MG/2ML SOLUTION: Performed by: NURSE ANESTHETIST, CERTIFIED REGISTERED

## 2024-04-17 PROCEDURE — 25010000002 MAGNESIUM SULFATE PER 500 MG OF MAGNESIUM: Performed by: NURSE ANESTHETIST, CERTIFIED REGISTERED

## 2024-04-17 PROCEDURE — 25010000002 ONDANSETRON PER 1 MG: Performed by: NURSE ANESTHETIST, CERTIFIED REGISTERED

## 2024-04-17 PROCEDURE — 25010000002 EPINEPHRINE 1 MG/ML SOLUTION 30 ML VIAL: Performed by: SURGERY

## 2024-04-17 PROCEDURE — 25010000002 ENOXAPARIN PER 10 MG: Performed by: SURGERY

## 2024-04-17 PROCEDURE — 74018 RADEX ABDOMEN 1 VIEW: CPT

## 2024-04-17 PROCEDURE — 43774 LAP RMVL GASTR ADJ ALL PARTS: CPT | Performed by: SURGERY

## 2024-04-17 PROCEDURE — 25010000002 MIDAZOLAM PER 1 MG: Performed by: STUDENT IN AN ORGANIZED HEALTH CARE EDUCATION/TRAINING PROGRAM

## 2024-04-17 PROCEDURE — S0260 H&P FOR SURGERY: HCPCS | Performed by: SURGERY

## 2024-04-17 PROCEDURE — 25810000003 LACTATED RINGERS SOLUTION: Performed by: SURGERY

## 2024-04-17 PROCEDURE — 25010000002 FENTANYL CITRATE (PF) 100 MCG/2ML SOLUTION: Performed by: NURSE ANESTHETIST, CERTIFIED REGISTERED

## 2024-04-17 PROCEDURE — 25010000002 PROPOFOL 200 MG/20ML EMULSION: Performed by: NURSE ANESTHETIST, CERTIFIED REGISTERED

## 2024-04-17 PROCEDURE — 25810000003 LACTATED RINGERS PER 1000 ML: Performed by: SURGERY

## 2024-04-17 PROCEDURE — 25010000002 ACETAMINOPHEN 10 MG/ML SOLUTION: Performed by: NURSE ANESTHETIST, CERTIFIED REGISTERED

## 2024-04-17 PROCEDURE — 25010000002 CLONIDINE PER 1 MG: Performed by: SURGERY

## 2024-04-17 PROCEDURE — 25010000002 CEFAZOLIN 3 G RECONSTITUTED SOLUTION 1 EACH VIAL: Performed by: SURGERY

## 2024-04-17 RX ORDER — SODIUM CHLORIDE 9 MG/ML
40 INJECTION, SOLUTION INTRAVENOUS AS NEEDED
Status: DISCONTINUED | OUTPATIENT
Start: 2024-04-17 | End: 2024-04-17 | Stop reason: HOSPADM

## 2024-04-17 RX ORDER — DIPHENHYDRAMINE HYDROCHLORIDE 50 MG/ML
25 INJECTION INTRAMUSCULAR; INTRAVENOUS EVERY 4 HOURS PRN
Status: DISCONTINUED | OUTPATIENT
Start: 2024-04-17 | End: 2024-04-17 | Stop reason: HOSPADM

## 2024-04-17 RX ORDER — MAGNESIUM SULFATE HEPTAHYDRATE 500 MG/ML
INJECTION, SOLUTION INTRAMUSCULAR; INTRAVENOUS AS NEEDED
Status: DISCONTINUED | OUTPATIENT
Start: 2024-04-17 | End: 2024-04-17 | Stop reason: SURG

## 2024-04-17 RX ORDER — SODIUM CHLORIDE 0.9 % (FLUSH) 0.9 %
3 SYRINGE (ML) INJECTION EVERY 12 HOURS SCHEDULED
Status: DISCONTINUED | OUTPATIENT
Start: 2024-04-17 | End: 2024-04-17 | Stop reason: HOSPADM

## 2024-04-17 RX ORDER — MIDAZOLAM HYDROCHLORIDE 1 MG/ML
1 INJECTION INTRAMUSCULAR; INTRAVENOUS
Status: COMPLETED | OUTPATIENT
Start: 2024-04-17 | End: 2024-04-17

## 2024-04-17 RX ORDER — KETAMINE HCL IN NACL, ISO-OSM 100MG/10ML
SYRINGE (ML) INJECTION AS NEEDED
Status: DISCONTINUED | OUTPATIENT
Start: 2024-04-17 | End: 2024-04-17 | Stop reason: SURG

## 2024-04-17 RX ORDER — SODIUM CHLORIDE 0.9 % (FLUSH) 0.9 %
10 SYRINGE (ML) INJECTION EVERY 12 HOURS SCHEDULED
Status: DISCONTINUED | OUTPATIENT
Start: 2024-04-17 | End: 2024-04-17 | Stop reason: HOSPADM

## 2024-04-17 RX ORDER — PANTOPRAZOLE SODIUM 40 MG/10ML
40 INJECTION, POWDER, LYOPHILIZED, FOR SOLUTION INTRAVENOUS ONCE
Status: COMPLETED | OUTPATIENT
Start: 2024-04-17 | End: 2024-04-17

## 2024-04-17 RX ORDER — LIDOCAINE HYDROCHLORIDE 10 MG/ML
0.5 INJECTION, SOLUTION INFILTRATION; PERINEURAL ONCE AS NEEDED
Status: DISCONTINUED | OUTPATIENT
Start: 2024-04-17 | End: 2024-04-17 | Stop reason: HOSPADM

## 2024-04-17 RX ORDER — DEXAMETHASONE SODIUM PHOSPHATE 4 MG/ML
INJECTION, SOLUTION INTRA-ARTICULAR; INTRALESIONAL; INTRAMUSCULAR; INTRAVENOUS; SOFT TISSUE AS NEEDED
Status: DISCONTINUED | OUTPATIENT
Start: 2024-04-17 | End: 2024-04-17 | Stop reason: SURG

## 2024-04-17 RX ORDER — LABETALOL HYDROCHLORIDE 5 MG/ML
5 INJECTION, SOLUTION INTRAVENOUS
Status: DISCONTINUED | OUTPATIENT
Start: 2024-04-17 | End: 2024-04-17 | Stop reason: HOSPADM

## 2024-04-17 RX ORDER — ROCURONIUM BROMIDE 10 MG/ML
INJECTION, SOLUTION INTRAVENOUS AS NEEDED
Status: DISCONTINUED | OUTPATIENT
Start: 2024-04-17 | End: 2024-04-17 | Stop reason: SURG

## 2024-04-17 RX ORDER — DIPHENHYDRAMINE HYDROCHLORIDE 50 MG/ML
12.5 INJECTION INTRAMUSCULAR; INTRAVENOUS
Status: DISCONTINUED | OUTPATIENT
Start: 2024-04-17 | End: 2024-04-17 | Stop reason: HOSPADM

## 2024-04-17 RX ORDER — ONDANSETRON 2 MG/ML
INJECTION INTRAMUSCULAR; INTRAVENOUS AS NEEDED
Status: DISCONTINUED | OUTPATIENT
Start: 2024-04-17 | End: 2024-04-17 | Stop reason: SURG

## 2024-04-17 RX ORDER — CHLORHEXIDINE GLUCONATE ORAL RINSE 1.2 MG/ML
15 SOLUTION DENTAL
Status: COMPLETED | OUTPATIENT
Start: 2024-04-17 | End: 2024-04-17

## 2024-04-17 RX ORDER — PROCHLORPERAZINE EDISYLATE 5 MG/ML
10 INJECTION INTRAMUSCULAR; INTRAVENOUS AS NEEDED
Status: DISCONTINUED | OUTPATIENT
Start: 2024-04-17 | End: 2024-04-17 | Stop reason: HOSPADM

## 2024-04-17 RX ORDER — PROMETHAZINE HYDROCHLORIDE 25 MG/1
25 SUPPOSITORY RECTAL ONCE AS NEEDED
Status: DISCONTINUED | OUTPATIENT
Start: 2024-04-17 | End: 2024-04-17 | Stop reason: HOSPADM

## 2024-04-17 RX ORDER — HYDROMORPHONE HYDROCHLORIDE 1 MG/ML
0.5 INJECTION, SOLUTION INTRAMUSCULAR; INTRAVENOUS; SUBCUTANEOUS
Status: DISCONTINUED | OUTPATIENT
Start: 2024-04-17 | End: 2024-04-17 | Stop reason: HOSPADM

## 2024-04-17 RX ORDER — FENTANYL CITRATE 50 UG/ML
50 INJECTION, SOLUTION INTRAMUSCULAR; INTRAVENOUS
Status: DISCONTINUED | OUTPATIENT
Start: 2024-04-17 | End: 2024-04-17 | Stop reason: HOSPADM

## 2024-04-17 RX ORDER — PROMETHAZINE HYDROCHLORIDE 25 MG/1
25 TABLET ORAL ONCE AS NEEDED
Status: DISCONTINUED | OUTPATIENT
Start: 2024-04-17 | End: 2024-04-17 | Stop reason: HOSPADM

## 2024-04-17 RX ORDER — HYDROCODONE BITARTRATE AND ACETAMINOPHEN 5; 325 MG/1; MG/1
1 TABLET ORAL ONCE AS NEEDED
Status: DISCONTINUED | OUTPATIENT
Start: 2024-04-17 | End: 2024-04-17 | Stop reason: HOSPADM

## 2024-04-17 RX ORDER — MAGNESIUM HYDROXIDE 1200 MG/15ML
LIQUID ORAL AS NEEDED
Status: DISCONTINUED | OUTPATIENT
Start: 2024-04-17 | End: 2024-04-17 | Stop reason: HOSPADM

## 2024-04-17 RX ORDER — FENTANYL CITRATE 50 UG/ML
INJECTION, SOLUTION INTRAMUSCULAR; INTRAVENOUS AS NEEDED
Status: DISCONTINUED | OUTPATIENT
Start: 2024-04-17 | End: 2024-04-17 | Stop reason: SURG

## 2024-04-17 RX ORDER — NALOXONE HCL 0.4 MG/ML
0.2 VIAL (ML) INJECTION AS NEEDED
Status: DISCONTINUED | OUTPATIENT
Start: 2024-04-17 | End: 2024-04-17 | Stop reason: HOSPADM

## 2024-04-17 RX ORDER — SODIUM CHLORIDE, SODIUM LACTATE, POTASSIUM CHLORIDE, CALCIUM CHLORIDE 600; 310; 30; 20 MG/100ML; MG/100ML; MG/100ML; MG/100ML
9 INJECTION, SOLUTION INTRAVENOUS CONTINUOUS
Status: DISCONTINUED | OUTPATIENT
Start: 2024-04-17 | End: 2024-04-17 | Stop reason: HOSPADM

## 2024-04-17 RX ORDER — IPRATROPIUM BROMIDE AND ALBUTEROL SULFATE 2.5; .5 MG/3ML; MG/3ML
3 SOLUTION RESPIRATORY (INHALATION) ONCE AS NEEDED
Status: DISCONTINUED | OUTPATIENT
Start: 2024-04-17 | End: 2024-04-17 | Stop reason: HOSPADM

## 2024-04-17 RX ORDER — PROPOFOL 10 MG/ML
INJECTION, EMULSION INTRAVENOUS AS NEEDED
Status: DISCONTINUED | OUTPATIENT
Start: 2024-04-17 | End: 2024-04-17 | Stop reason: SURG

## 2024-04-17 RX ORDER — SODIUM CHLORIDE, SODIUM LACTATE, POTASSIUM CHLORIDE, CALCIUM CHLORIDE 600; 310; 30; 20 MG/100ML; MG/100ML; MG/100ML; MG/100ML
100 INJECTION, SOLUTION INTRAVENOUS CONTINUOUS
Status: DISCONTINUED | OUTPATIENT
Start: 2024-04-17 | End: 2024-04-17 | Stop reason: HOSPADM

## 2024-04-17 RX ORDER — ENOXAPARIN SODIUM 100 MG/ML
40 INJECTION SUBCUTANEOUS ONCE
Status: COMPLETED | OUTPATIENT
Start: 2024-04-17 | End: 2024-04-17

## 2024-04-17 RX ORDER — OXYCODONE AND ACETAMINOPHEN 7.5; 325 MG/1; MG/1
1 TABLET ORAL EVERY 4 HOURS PRN
Status: DISCONTINUED | OUTPATIENT
Start: 2024-04-17 | End: 2024-04-17 | Stop reason: HOSPADM

## 2024-04-17 RX ORDER — ONDANSETRON 2 MG/ML
4 INJECTION INTRAMUSCULAR; INTRAVENOUS ONCE AS NEEDED
Status: DISCONTINUED | OUTPATIENT
Start: 2024-04-17 | End: 2024-04-17 | Stop reason: HOSPADM

## 2024-04-17 RX ORDER — DROPERIDOL 2.5 MG/ML
0.62 INJECTION, SOLUTION INTRAMUSCULAR; INTRAVENOUS
Status: DISCONTINUED | OUTPATIENT
Start: 2024-04-17 | End: 2024-04-17 | Stop reason: HOSPADM

## 2024-04-17 RX ORDER — SUCCINYLCHOLINE/SOD CL,ISO/PF 200MG/10ML
SYRINGE (ML) INTRAVENOUS AS NEEDED
Status: DISCONTINUED | OUTPATIENT
Start: 2024-04-17 | End: 2024-04-17 | Stop reason: SURG

## 2024-04-17 RX ORDER — SODIUM CHLORIDE 0.9 % (FLUSH) 0.9 %
1-10 SYRINGE (ML) INJECTION AS NEEDED
Status: DISCONTINUED | OUTPATIENT
Start: 2024-04-17 | End: 2024-04-17 | Stop reason: HOSPADM

## 2024-04-17 RX ORDER — FLUMAZENIL 0.1 MG/ML
0.2 INJECTION INTRAVENOUS AS NEEDED
Status: DISCONTINUED | OUTPATIENT
Start: 2024-04-17 | End: 2024-04-17 | Stop reason: HOSPADM

## 2024-04-17 RX ORDER — LIDOCAINE HYDROCHLORIDE 20 MG/ML
INJECTION, SOLUTION EPIDURAL; INFILTRATION; INTRACAUDAL; PERINEURAL AS NEEDED
Status: DISCONTINUED | OUTPATIENT
Start: 2024-04-17 | End: 2024-04-17 | Stop reason: SURG

## 2024-04-17 RX ORDER — SCOLOPAMINE TRANSDERMAL SYSTEM 1 MG/1
1 PATCH, EXTENDED RELEASE TRANSDERMAL CONTINUOUS
Status: DISCONTINUED | OUTPATIENT
Start: 2024-04-17 | End: 2024-04-17 | Stop reason: HOSPADM

## 2024-04-17 RX ORDER — SODIUM CHLORIDE 0.9 % (FLUSH) 0.9 %
3-10 SYRINGE (ML) INJECTION AS NEEDED
Status: DISCONTINUED | OUTPATIENT
Start: 2024-04-17 | End: 2024-04-17 | Stop reason: HOSPADM

## 2024-04-17 RX ORDER — ACETAMINOPHEN 10 MG/ML
INJECTION, SOLUTION INTRAVENOUS AS NEEDED
Status: DISCONTINUED | OUTPATIENT
Start: 2024-04-17 | End: 2024-04-17 | Stop reason: SURG

## 2024-04-17 RX ORDER — EPHEDRINE SULFATE 50 MG/ML
5 INJECTION, SOLUTION INTRAVENOUS ONCE AS NEEDED
Status: DISCONTINUED | OUTPATIENT
Start: 2024-04-17 | End: 2024-04-17 | Stop reason: HOSPADM

## 2024-04-17 RX ORDER — GABAPENTIN 300 MG/1
300 CAPSULE ORAL ONCE
Status: COMPLETED | OUTPATIENT
Start: 2024-04-17 | End: 2024-04-17

## 2024-04-17 RX ORDER — METOCLOPRAMIDE HYDROCHLORIDE 5 MG/ML
10 INJECTION INTRAMUSCULAR; INTRAVENOUS ONCE
Status: COMPLETED | OUTPATIENT
Start: 2024-04-17 | End: 2024-04-17

## 2024-04-17 RX ADMIN — MAGNESIUM SULFATE HEPTAHYDRATE 2 G: 500 INJECTION, SOLUTION INTRAMUSCULAR; INTRAVENOUS at 07:19

## 2024-04-17 RX ADMIN — ROCURONIUM BROMIDE 5 MG: 10 INJECTION, SOLUTION INTRAVENOUS at 07:14

## 2024-04-17 RX ADMIN — HYDROMORPHONE HYDROCHLORIDE 0.5 MG: 1 INJECTION, SOLUTION INTRAMUSCULAR; INTRAVENOUS; SUBCUTANEOUS at 09:35

## 2024-04-17 RX ADMIN — METOCLOPRAMIDE 10 MG: 5 INJECTION, SOLUTION INTRAMUSCULAR; INTRAVENOUS at 06:42

## 2024-04-17 RX ADMIN — MIDAZOLAM 1 MG: 1 INJECTION INTRAMUSCULAR; INTRAVENOUS at 06:41

## 2024-04-17 RX ADMIN — LIDOCAINE HYDROCHLORIDE 100 MG: 20 INJECTION, SOLUTION EPIDURAL; INFILTRATION; INTRACAUDAL; PERINEURAL at 07:14

## 2024-04-17 RX ADMIN — OXYCODONE AND ACETAMINOPHEN 1 TABLET: 7.5; 325 TABLET ORAL at 09:18

## 2024-04-17 RX ADMIN — PROPOFOL 110 MCG/KG/MIN: 10 INJECTION, EMULSION INTRAVENOUS at 07:14

## 2024-04-17 RX ADMIN — FENTANYL CITRATE 25 MCG: 50 INJECTION, SOLUTION INTRAMUSCULAR; INTRAVENOUS at 08:27

## 2024-04-17 RX ADMIN — SODIUM CHLORIDE, POTASSIUM CHLORIDE, SODIUM LACTATE AND CALCIUM CHLORIDE 500 ML: 600; 310; 30; 20 INJECTION, SOLUTION INTRAVENOUS at 06:29

## 2024-04-17 RX ADMIN — MIDAZOLAM 1 MG: 1 INJECTION INTRAMUSCULAR; INTRAVENOUS at 06:55

## 2024-04-17 RX ADMIN — DEXAMETHASONE SODIUM PHOSPHATE 16 MG: 4 INJECTION, SOLUTION INTRAMUSCULAR; INTRAVENOUS at 07:15

## 2024-04-17 RX ADMIN — HYOSCYAMINE SULFATE 125 MCG: 0.12 TABLET ORAL; SUBLINGUAL at 09:15

## 2024-04-17 RX ADMIN — GABAPENTIN 300 MG: 300 CAPSULE ORAL at 06:42

## 2024-04-17 RX ADMIN — 0.12% CHLORHEXIDINE GLUCONATE 15 ML: 1.2 RINSE ORAL at 06:42

## 2024-04-17 RX ADMIN — SODIUM CHLORIDE 3 G: 900 INJECTION INTRAVENOUS at 06:59

## 2024-04-17 RX ADMIN — SODIUM CHLORIDE, POTASSIUM CHLORIDE, SODIUM LACTATE AND CALCIUM CHLORIDE 100 ML/HR: 600; 310; 30; 20 INJECTION, SOLUTION INTRAVENOUS at 06:30

## 2024-04-17 RX ADMIN — FENTANYL CITRATE 25 MCG: 50 INJECTION, SOLUTION INTRAMUSCULAR; INTRAVENOUS at 08:34

## 2024-04-17 RX ADMIN — ROCURONIUM BROMIDE 45 MG: 10 INJECTION, SOLUTION INTRAVENOUS at 07:18

## 2024-04-17 RX ADMIN — SCOPALAMINE 1 PATCH: 1 PATCH, EXTENDED RELEASE TRANSDERMAL at 06:43

## 2024-04-17 RX ADMIN — ONDANSETRON 4 MG: 2 INJECTION INTRAMUSCULAR; INTRAVENOUS at 07:23

## 2024-04-17 RX ADMIN — PANTOPRAZOLE SODIUM 40 MG: 40 INJECTION, POWDER, LYOPHILIZED, FOR SOLUTION INTRAVENOUS at 06:42

## 2024-04-17 RX ADMIN — ENOXAPARIN SODIUM 40 MG: 100 INJECTION SUBCUTANEOUS at 10:08

## 2024-04-17 RX ADMIN — SUGAMMADEX 400 MG: 100 INJECTION, SOLUTION INTRAVENOUS at 08:00

## 2024-04-17 RX ADMIN — Medication 20 MG: at 07:18

## 2024-04-17 RX ADMIN — ACETAMINOPHEN 1000 MG: 1000 INJECTION INTRAVENOUS at 07:25

## 2024-04-17 RX ADMIN — PROPOFOL 130 MG: 10 INJECTION, EMULSION INTRAVENOUS at 07:14

## 2024-04-17 RX ADMIN — FENTANYL CITRATE 50 MCG: 50 INJECTION, SOLUTION INTRAMUSCULAR; INTRAVENOUS at 07:14

## 2024-04-17 RX ADMIN — Medication 200 MG: at 07:14

## 2024-04-17 NOTE — ANESTHESIA PREPROCEDURE EVALUATION
Anesthesia Evaluation     Patient summary reviewed and Nursing notes reviewed   no history of anesthetic complications:   NPO Solid Status: > 8 hours  NPO Liquid Status: > 2 hours           Airway   Mallampati: II  TM distance: >3 FB  Neck ROM: full  Dental      Pulmonary    (+) COPD,home oxygen, sleep apnea  Cardiovascular     ECG reviewed    (+) CAD, CHF Systolic <55%    ROS comment: EF 34% per TTE August 2023  EF 25-30% per TTE April 2022     Severe Pulm HTN    Neuro/Psych  GI/Hepatic/Renal/Endo    (+) morbid obesity, GERD, renal disease- CRI, diabetes mellitus    Musculoskeletal     Abdominal    Substance History      OB/GYN          Other                      Anesthesia Plan    ASA 4     general     intravenous induction     Anesthetic plan, risks, benefits, and alternatives have been provided, discussed and informed consent has been obtained with: patient.      CODE STATUS:

## 2024-04-17 NOTE — ANESTHESIA POSTPROCEDURE EVALUATION
Patient: Nicolasa Clemons    Procedure Summary       Date: 04/17/24 Room / Location:  [facility]  [facility]    Anesthesia Start: 0700 Anesthesia Stop: 0842    Procedure: Adjustable gastric banding removal LAPAROSCOPIC ,LYSIS OF ADHESIONS,PORT REMOVAL (Abdomen) Diagnosis:       Gastric band malfunction      (Gastric band malfunction [K95.09])    Surgeons: Jose Marte Jr., MD Provider: Judah Jackson MD    Anesthesia Type: general ASA Status: 4            Anesthesia Type: general    Vitals  Vitals Value Taken Time   /103 04/17/24 0945   Temp 36.3 °C (97.4 °F) 04/17/24 0836   Pulse 79 04/17/24 0956   Resp 20 04/17/24 0930   SpO2 95 % 04/17/24 0956   Vitals shown include unfiled device data.        Post Anesthesia Care and Evaluation    Patient location during evaluation: bedside  Patient participation: complete - patient participated  Level of consciousness: awake and alert  Pain management: adequate    Airway patency: patent  Anesthetic complications: No anesthetic complications  PONV Status: controlled  Cardiovascular status: blood pressure returned to baseline and acceptable  Respiratory status: acceptable  Hydration status: acceptable

## 2024-04-17 NOTE — ANESTHESIA PROCEDURE NOTES
Airway  Urgency: elective    Date/Time: 4/17/2024 7:16 AM  Airway not difficult    General Information and Staff    Patient location during procedure: OR  Anesthesiologist: Judah Jackson MD    Indications and Patient Condition  Indications for airway management: airway protection    Preoxygenated: yes  MILS not maintained throughout  Mask difficulty assessment: 0 - not attempted    Final Airway Details  Final airway type: endotracheal airway      Successful airway: ETT  Cuffed: yes   Successful intubation technique: direct laryngoscopy  Facilitating devices/methods: intubating stylet  Endotracheal tube insertion site: oral  Blade: [place]  Blade size: 3  ETT size (mm): 7.0  Cormack-Lehane Classification: grade I - full view of glottis  Placement verified by: chest auscultation and capnometry   Measured from: lips  ETT/EBT  to lips (cm): 21  Number of attempts at approach: 1  Assessment: lips, teeth, and gum same as pre-op and atraumatic intubation

## 2024-04-17 NOTE — OP NOTE
Surgeon: Jose Marte Jr., M.D.    Assistant: NAHEED Vallecillo      Pre-Operative Diagnosis: Chronic dysphagia status post adjustable gastric band with band malfunction/slip    Post-Operative Diagnosis: 1. Same  2. Adhesions upper abdomen 3. Abnormal gastric pouch size/slip    Procedure Performed: Laparoscopic adjustable gastric band and port removal with lysis of adhesions    Anesthesia: GETA and laparoscopic tap block with ropivacaine mixture    Specimens: Adjustable gastric band and port inspected then discarded    EBL: less than 10 ml    Fluids:  500 ml crystalloid    Complications: None    Surgery assisted and facilitated by a certified physician assistant, who directly resulted in a decreased operative time, anesthetic time, wound exposure, and possibly of an operative wound infection, thereby decreasing patient morbidity and ultimately total expenditures.     Indications:   Patient is status post adjustable gastric band placement with chronic dysphagia who now presents for elective removal. The risks and benefits of the procedure were discussed with the patient in detail and all questions were answered.  Possibility of open, bleeding, infection, bowel injury, deep venous thrombosis, pulmonary embolism, incisional hernias, renal failure, myocardial infarction, respiratory and cardiac arrest and death were discussed. Consent was signed and witnessed.    Procedure:   Patient was identified and after informed consent was obtained the patient was taken to the operating room and placed in the supine position. Patient was given preoperative antibiotic and SCDs were placed by the nursing staff.  After adequate general anesthesia the abdomen was prepped with choroprep and draped in the usual sterile fashion. The previous incisions were marked with indelible ink. Using a 5 mm Visiport trocar and 5 mm 0 degree laparoscope the abdomen was entered without difficulty and a pneumoperitoneum was established with good  opening pressures. The abdomen was then insufflated to a pressure of 12 mmHg with CO2 gas. General laparoscopic evaluation of the abdominal cavity revealed no injury upon entry with the trocar.  A 10 mm trocar was placed at the port site incision under direct visualization and then 2  5 mm trochars were placed in the right upper quadrant and left upper quadrant under direct visualization.  The left lobe of the liver was elevated with a grasper. The adhesions overlying the buckle of the band were taken down with the electrocautery. The buckle was identified and opened up without difficulty. The adhesions overlying the band laterally were taken down with electrocautery. Careful attention was made not to injure the stomach. Sutures were removed with the scissors. The tubing of the band was cut with the scissors and the band was pulled from around the stomach without any difficulty. The lapband was taken out of the 10-11 trocar site. The stomach was inspected prior to removing the adjustable gastric band and no evidence of erosion was noted. There was a capsule noted at the previous adjustable gastric band site and this was opened up with the scissors and taken down. The band was inspected and no discoloration was noted unless dictated below. The gastric band was then discarded. The trocars were then removed under direct visualization. No bleeding was noted. The pneumoperitoneum was desufflated. The incisions were then infiltrated with ropivacaine based anesthetic mixture A total of 60 ml was used throughout the case.  The incision at the port site was then carried down to the port with electrocautery. The port was Identified and the tubing was brouht up with a right angle clamp. Sutures were cut and removed. The port was removed without difficulty. The port was inspected and no abnormalities were seen. The incision was also infiltrated with the local anesthetic. The wound was irrigated with saline and dried. No  bleeding was noted. The subcutaneous tissue was reapproximated with a 2-0 vicryl in an interrupted fashion and skin incisions closed with a 4-0 Monocryl in a subcuticular fashion. The areas were then cleaned and dried and Dermabond was placed. At the end of the case x-ray of the abdomen was performed and there was no part of the gastric band noted. The patient tolerated the procedure well and left operating room in good condition. Sponges and needles were counted and reported as correct.

## 2024-04-17 NOTE — ANESTHESIA PROCEDURE NOTES
Arterial Line    Pre-sedation assessment completed: 4/17/2024 6:34 AM    Patient reassessed immediately prior to procedure    Patient location during procedure: pre-op  Start time: 4/17/2024 6:35 AM  Stop Time:4/17/2024 6:40 AM       Line placed for hemodynamic monitoring and ABGs/Labs/ISTAT.  Performed By   Anesthesiologist: Judah Jackson MD   Preanesthetic Checklist  Completed: patient identified, IV checked, site marked, risks and benefits discussed, surgical consent, monitors and equipment checked, pre-op evaluation and timeout performed  Arterial Line Prep    Sterile Tech: gloves, mask, cap and sterile barriers  Prep: ChloraPrep  Patient monitoring: blood pressure monitoring, continuous pulse oximetry and EKG  Arterial Line Procedure   Laterality:right  Location:  radial artery  Catheter size: 20 G   Guidance: ultrasound guided  PROCEDURE NOTE/ULTRASOUND INTERPRETATION.  Using ultrasound guidance the potential vascular sites for insertion of the catheter were visualized to determine the patency of the vessel to be used for vascular access.  After selecting the appropriate site for insertion, the needle was visualized under ultrasound being inserted into the radial artery, followed by ultrasound confirmation of wire and catheter placement. There were no abnormalities seen on ultrasound; an image was taken; and the patient tolerated the procedure with no complications.   Number of attempts: 1  Successful placement: yes   Post Assessment   Dressing Type: occlusive dressing applied, secured with tape and wrist guard applied.   Complications no  Circ/Move/Sens Assessment: unchanged.   Patient Tolerance: patient tolerated the procedure well with no apparent complications  Additional Notes  Ultrasound guidance used to visualize radial artery anatomy and guide catheter placement

## 2024-04-17 NOTE — H&P
[facility]  [address] [address]  [phone]  [address] [address]  [phone]  Dept: [phone]  1/11/2024        Nicolasa Clemons.  [id number]  [id number]  1970  female             Chief Complaint   Patient presents with    Consult       LUANA - Band 2017           Post-Op Bariatric Surgery:   Nicolasa Clmeons is status post Lapband procedure, performed on 2017 at [facility].      HPI:   Today's weight is (!) 167 kg (368 lb)  pounds, today's BMI is Body mass index is 65.2 kg/m².. The patient reports difficulty with solid foods.     Patient admits to nausea and dysphagia. The patient denies abdominal pain. The patientdoes have vomitng. The patientdoes have reflux.     Diet and Exercise: Diet history reviewed and discussed with the patient. Weight loss/gains to date discussed with the patient. She cannot tolerate solid protein.         The following portions of the patient's history were reviewed and updated as appropriate: allergies, current medications, past family history, past medical history, past social history, past surgical history, and problem list.         Vitals:     01/11/24 1443   BP: 137/91   Pulse: 88   Temp: 97.3 °F (36.3 °C)         Review of Systems   Constitutional:  Positive for fatigue.   Musculoskeletal:  Positive for arthralgias, back pain and gait problem.   Neurological:  Positive for weakness.   All other systems reviewed and are negative.        Physical Exam  Vitals reviewed.   HENT:      Head: Normocephalic and atraumatic.      Mouth/Throat:      Mouth: Mucous membranes are moist.      Pharynx: Oropharynx is clear.   Eyes:      General: No scleral icterus.     Extraocular Movements: Extraocular movements intact.      Conjunctiva/sclera: Conjunctivae normal.      Pupils: Pupils are equal, round, and reactive to light.   Neck:      Thyroid: No thyromegaly.    Cardiovascular:      Rate and Rhythm: Normal rate.   Pulmonary:      Effort: Pulmonary effort is normal. No respiratory distress.      Breath sounds: Normal breath sounds. No stridor. No wheezing or rhonchi.   Abdominal:      General: Bowel sounds are normal.      Palpations: Abdomen is soft.      Tenderness: There is no abdominal tenderness. There is no right CVA tenderness, left CVA tenderness, guarding or rebound.      Hernia: No hernia is present.   Musculoskeletal:         General: Normal range of motion.      Cervical back: Normal range of motion and neck supple.   Lymphadenopathy:      Cervical: No cervical adenopathy.   Skin:     General: Skin is warm and dry.      Findings: No erythema.   Neurological:      Mental Status: She is alert and oriented to person, place, and time.      Motor: Weakness present.      Gait: Gait abnormal.   Psychiatric:         Mood and Affect: Mood normal.         Behavior: Behavior normal.         Thought Content: Thought content normal.         Judgment: Judgment normal.            Assessment: Post-operatively the patient status post lap band 2017 at [facility] where her greatest weight loss was around 60 pounds.  She has had trouble with the band over the past several years with regurgitating food, dysphagia with solids as well as heartburn.  She would like to have the band removed and interested in conversion to gastric bypass or gastric sleeve.  She has a history of smoking and states she quit about 3 years ago but states that she has smoked a few cigarettes in the past few months because of stress.  We went through all the different procedures and with her smoking history she feels like the gastric sleeve is the better procedure for her and she was concerned about vitamin deficiencies as well.  We will proceed with upper endoscopy to evaluate the band and proceed with insurance approval for band removal.  The risks and benefits of the procedure were discussed with the  patient in detail and all questions were answered.  Possibility of perforation, bleeding, aspiration, anoxic brain injury, respiratory and/or cardiac arrest and death were discussed.  Consent will be signed and witnessed.  She has a complicated cardiac history and plans on seeing her cardiologist on Monday and will get clearance for surgery.          Encounter Diagnoses   Name Primary?    Regurgitation of food Yes    Other dysphagia      Gastroesophageal reflux disease, unspecified whether esophagitis present      History of adjustable gastric banding      Essential hypertension      Diabetes mellitus type 2 in obese      Other emphysema      Coronary artery disease involving native coronary artery of native heart without angina pectoris      Systolic congestive heart failure, unspecified HF chronicity      Class 3 severe obesity due to excess calories with serious comorbidity and body mass index (BMI) of 60.0 to 69.9 in adult           Plan:    My impression is Nicolasa Clemons has a problem of her band as listed above.  I think she would benefit from band removal.  The risks and benefits of the procedure were discussed with the patient in detail and all questions were answered.  Possibility of open, bleeding, infection, bowel injury, deep venous thrombosis, pulmonary embolism, incisional hernias, renal failure, myocardial infarction, respiratory and cardiac arrest and death were discussed. Consent will be signed and witnessed.     Reviewed the importance of being able to tolerate dense/solid protein and vegetables for weight loss. Discussed eating foods that are easier to tolerate, softer foods, usually contribute to weight gain because they are higher calorie/carb and will not keep patient full for the recommended 3-4 hours.       She should follow-up after band removal.       EGD discussed with patient and all questions answered     Approximately 45minutes was spent reviewing records and spending time with the  patient and over 30 minutes spent counseling.     Activity restrictions: None.   Instructions / Recommendations: dietary counseling recommended, recommended a daily protein intake of  grams, patient was advised that the lap band system works best when consuming solid foods, vitamin supplement(s) recommended, recommended exercising at least 150 minutes per week, behavior modifications recommended and instructed to call the office for concerns, questions, or problems.

## 2024-07-09 ENCOUNTER — OFFICE VISIT (OUTPATIENT)
Dept: BARIATRICS/WEIGHT MGMT | Facility: CLINIC | Age: 54
End: 2024-07-09
Payer: MEDICAID

## 2024-07-09 VITALS
SYSTOLIC BLOOD PRESSURE: 155 MMHG | BODY MASS INDEX: 51.91 KG/M2 | WEIGHT: 293 LBS | TEMPERATURE: 98.4 F | HEIGHT: 63 IN | DIASTOLIC BLOOD PRESSURE: 76 MMHG | HEART RATE: 91 BPM

## 2024-07-09 DIAGNOSIS — F41.9 ANXIETY AND DEPRESSION: ICD-10-CM

## 2024-07-09 DIAGNOSIS — F32.A ANXIETY AND DEPRESSION: ICD-10-CM

## 2024-07-09 DIAGNOSIS — Z98.84 HISTORY OF REMOVAL OF LAPAROSCOPIC GASTRIC BANDING DEVICE: ICD-10-CM

## 2024-07-09 DIAGNOSIS — I50.20 SYSTOLIC CONGESTIVE HEART FAILURE, UNSPECIFIED HF CHRONICITY: ICD-10-CM

## 2024-07-09 DIAGNOSIS — I10 ESSENTIAL HYPERTENSION: ICD-10-CM

## 2024-07-09 DIAGNOSIS — E11.69 TYPE 2 DIABETES MELLITUS WITH OBESITY: ICD-10-CM

## 2024-07-09 DIAGNOSIS — E66.9 TYPE 2 DIABETES MELLITUS WITH OBESITY: ICD-10-CM

## 2024-07-09 DIAGNOSIS — I25.10 CORONARY ARTERY DISEASE INVOLVING NATIVE CORONARY ARTERY OF NATIVE HEART WITHOUT ANGINA PECTORIS: ICD-10-CM

## 2024-07-09 DIAGNOSIS — E66.01 CLASS 3 SEVERE OBESITY DUE TO EXCESS CALORIES WITH SERIOUS COMORBIDITY AND BODY MASS INDEX (BMI) OF 60.0 TO 69.9 IN ADULT: Primary | ICD-10-CM

## 2024-07-09 PROBLEM — R13.19 OTHER DYSPHAGIA: Status: RESOLVED | Noted: 2024-01-11 | Resolved: 2024-07-09

## 2024-07-09 PROBLEM — K95.09 GASTRIC BAND SLIPPAGE: Status: RESOLVED | Noted: 2024-01-30 | Resolved: 2024-07-09

## 2024-07-09 PROBLEM — K95.09 GASTRIC BAND MALFUNCTION: Status: RESOLVED | Noted: 2024-04-06 | Resolved: 2024-07-09

## 2024-07-09 PROBLEM — R11.10 REGURGITATION OF FOOD: Status: RESOLVED | Noted: 2024-01-11 | Resolved: 2024-07-09

## 2024-07-09 PROCEDURE — 1159F MED LIST DOCD IN RCRD: CPT | Performed by: SURGERY

## 2024-07-09 PROCEDURE — 3077F SYST BP >= 140 MM HG: CPT | Performed by: SURGERY

## 2024-07-09 PROCEDURE — 3078F DIAST BP <80 MM HG: CPT | Performed by: SURGERY

## 2024-07-09 PROCEDURE — 99214 OFFICE O/P EST MOD 30 MIN: CPT | Performed by: SURGERY

## 2024-07-09 PROCEDURE — 1160F RVW MEDS BY RX/DR IN RCRD: CPT | Performed by: SURGERY

## 2024-07-09 NOTE — PROGRESS NOTES
[facility] BARIATRIC [facility] BARIATRIC SURGERY  [address]  [facility] [id number] [phone]  [address]  [facility] [id number] [phone]  Dept: [phone]  7/9/2024      Nicolasa Clemons.  [id number]  [id number]  1970  female      Chief Complaint   Patient presents with    Follow-up     Follow up / Discuss revision        BH Post-Op Bariatric Surgery:   Nicolasa Clemons is status post Laparoscopic Band Removal procedure, performed on 4/2024     HPI:   Today's weight is (!) 170 kg (374 lb) pounds, today's BMI is Body mass index is 66.27 kg/m²., a  gain of 6 pounds since the last visit     Nicolasa Clemons denies nausea vomiting fever chills chest pain shortness of air abdominal pain and reports wanting to proceed with conversion to gastric sleeve.  Patient is having some issues with anxiety and depression and needs to get back on track.  She is on oxygen and has history of congestive heart failure and coronary artery disease.     Diet and Exercise: Diet history reviewed and discussed with the patient. Weight loss/gains to date discussed with the patient. The patient states they are eating around unknown grams of protein per day. She reports eating 3 meals per day, a typical portion size of 2 cup, eating 2 snacks per day, drinking 4 or more 8-oz. glasses of water per day, no carbonated beverage consumption and exercising regularly.   .     Review of Systems   Constitutional:  Positive for fatigue.   Musculoskeletal:  Positive for arthralgias, back pain and gait problem.   All other systems reviewed and are negative.        * No active hospital problems. *      Past Medical History:   Diagnosis Date    Anemia     Anxiety     CHF (congestive heart failure)     Depression     Diabetes mellitus     Emphysema of lung     GERD (gastroesophageal reflux disease)     History of irregular heartbeat     History of transfusion     Hypertension     Left knee  pain     Myocardial infarct, old     Neuropathy     Sleep apnea     NO MACHINE       The following portions of the patient's history were reviewed and updated as appropriate: allergies, current medications, past family history, past medical history, past social history, past surgical history, and problem list.    Vitals:    07/09/24 1209   BP: 155/76   Pulse: 91   Temp: 98.4 °F (36.9 °C)       Physical Exam  Constitutional:       Appearance: Normal appearance.   HENT:      Head: Normocephalic and atraumatic.   Eyes:      Conjunctiva/sclera: Conjunctivae normal.   Pulmonary:      Effort: Pulmonary effort is normal.   Musculoskeletal:      Right lower leg: Edema present.      Left lower leg: Edema present.   Neurological:      Mental Status: She is alert and oriented to person, place, and time.      Motor: Weakness present.      Gait: Gait abnormal.   Psychiatric:         Mood and Affect: Mood normal.         Behavior: Behavior normal.         Thought Content: Thought content normal.         Judgment: Judgment normal.         Assessment:   Post-op, the patient status post lap band removal who would like to proceed with conversion to gastric sleeve.  We will go ahead and start the intake process and did discuss counseling that she will talk with her psychologist at that intake appointment.  I did give her handout on Sabianism milestone to get involved with exercise especially with aquatics.  Also to follow-up with her cardiologist and primary care doctor to get things going.  I instructed her about clean eating concentrating on meals only and and the need to be losing weight and to also to increase her mobility. .     Encounter Diagnoses   Name Primary?    Class 3 severe obesity due to excess calories with serious comorbidity and body mass index (BMI) of 60.0 to 69.9 in adult Yes    History of removal of laparoscopic gastric banding device     Diabetes mellitus type 2 in obese     Essential hypertension     Coronary  artery disease involving native coronary artery of native heart without angina pectoris     Systolic congestive heart failure, unspecified HF chronicity     Anxiety and depression        Plan:     Encouraged patient to be sure to get plenty of lean protein per day through small meals all with a protein source.   Activity restrictions: none.   Recommended patient be sure to get at least 70 grams of protein per day by eating small  meals all with high lean protein choices. Be sure to limit/cut back on daily carbohydrate intake. Discussed with the patient the recommended amount of water per day to intake. Reviewed vitamin requirements. Be sure to do routine exercise including strength training.    Instructed to call the office for concerns, questions, or problems.     The patient was instructed to follow up in at intake.     The patient was counseled regarding the above procedure. Total time spent on this encounter was  35 minutes including reviewing previous notes, lab results and face to face time spent with the patient.  The majority of time was spent counseling the patient regarding diet and exercise as well as reviewing their medications and their compliance with the procedure.

## 2024-07-30 ENCOUNTER — CONSULT (OUTPATIENT)
Dept: BARIATRICS/WEIGHT MGMT | Facility: CLINIC | Age: 54
End: 2024-07-30
Payer: MEDICAID

## 2024-07-30 VITALS
TEMPERATURE: 98 F | SYSTOLIC BLOOD PRESSURE: 134 MMHG | BODY MASS INDEX: 51.91 KG/M2 | HEART RATE: 101 BPM | WEIGHT: 293 LBS | HEIGHT: 63 IN | DIASTOLIC BLOOD PRESSURE: 70 MMHG

## 2024-07-30 DIAGNOSIS — I10 ESSENTIAL HYPERTENSION: ICD-10-CM

## 2024-07-30 DIAGNOSIS — E66.01 MORBID OBESITY WITH BMI OF 60.0-69.9, ADULT: Primary | ICD-10-CM

## 2024-07-30 DIAGNOSIS — Z01.818 PRE-OP EVALUATION: ICD-10-CM

## 2024-07-30 DIAGNOSIS — G47.33 OSA (OBSTRUCTIVE SLEEP APNEA): ICD-10-CM

## 2024-07-30 DIAGNOSIS — E66.9 TYPE 2 DIABETES MELLITUS WITH OBESITY: ICD-10-CM

## 2024-07-30 DIAGNOSIS — E11.69 TYPE 2 DIABETES MELLITUS WITH OBESITY: ICD-10-CM

## 2024-07-30 DIAGNOSIS — Z72.0 TOBACCO USE: ICD-10-CM

## 2024-07-30 PROBLEM — Z71.3 DIETARY COUNSELING: Status: ACTIVE | Noted: 2024-07-30

## 2024-07-30 PROBLEM — J43.8 OTHER EMPHYSEMA: Status: RESOLVED | Noted: 2024-01-11 | Resolved: 2024-07-30

## 2024-07-30 PROBLEM — J44.9 COPD (CHRONIC OBSTRUCTIVE PULMONARY DISEASE): Status: ACTIVE | Noted: 2024-07-30

## 2024-07-30 PROBLEM — M25.50 MULTIPLE JOINT PAIN: Status: ACTIVE | Noted: 2024-07-30

## 2024-07-30 PROBLEM — I27.20 PULMONARY HYPERTENSION: Status: ACTIVE | Noted: 2024-07-30

## 2024-07-30 PROBLEM — N92.6 IRREGULAR MENSTRUAL CYCLE: Status: ACTIVE | Noted: 2024-07-30

## 2024-07-30 RX ORDER — ERGOCALCIFEROL 1.25 MG/1
CAPSULE ORAL
COMMUNITY
Start: 2024-07-14

## 2024-07-30 RX ORDER — FLUTICASONE PROPIONATE 110 UG/1
1 AEROSOL, METERED RESPIRATORY (INHALATION) EVERY 12 HOURS SCHEDULED
COMMUNITY

## 2024-07-30 NOTE — PROGRESS NOTES
"Metabolic and Bariatric Surgery Nutrition Assessment    Patient Name: Nicolasa Clemons    YOB: 1970   Age: 53 y.o.  Sex: female  MRN: [id number]     ASSESSMENT    Procedure considering: sleeve    Anthropometric Measurements  Estimated body mass index is 65.2 kg/m² as calculated from the following:    Height as of this encounter: 160 cm (62.99\").    Weight as of this encounter: 167 kg (368 lb).    Patient Goals and Expectations                        Patient's stated weight goal: 220-240 lbs  Reasons for desiring weight loss: improved health     Medical History  Pertinent diagnosis/problems: coronary artery disease, hypertension, GERD, diabetes, anxiety, depression  Past Medical History:   Diagnosis Date    Anemia     Anxiety     CHF (congestive heart failure)     Depression     Diabetes mellitus     Emphysema of lung     GERD (gastroesophageal reflux disease)     History of irregular heartbeat     History of transfusion     Hypertension     Left knee pain     Myocardial infarct, old     Neuropathy     Sleep apnea     NO MACHINE     Past Surgical History:   Procedure Laterality Date     SECTION      ENDOSCOPY N/A 2024    Procedure: ESOPHAGOGASTRODUODENOSCOPY WITH BIOPSY;  Surgeon: Jose Marte Jr., MD;  Location: [facility] ENDOSCOPY;  Service: General;  Laterality: N/A;  PRE- DYSPHAGIA, H/O BAND  POST- SAME, GASTRITIS    GASTRIC BANDING REMOVAL N/A 2024    Procedure: Adjustable gastric banding removal LAPAROSCOPIC ,LYSIS OF ADHESIONS,PORT REMOVAL;  Surgeon: Jose Marte Jr., MD;  Location: [facility] [place] [facility] [place];  Service: General;  Laterality: N/A;    KNEE SURGERY Left     LAPAROSCOPIC GASTRIC BANDING      dr yung    TUBAL ABDOMINAL LIGATION           Weight History   Highest adult weight: 380 lbs   Lowest adult weight: 245 lbs   Onset of obesity: Always been overweight    Possible triggers or life events that may have led to weight gain:   Trauma     Previous " Weight Loss Efforts   Patient has tried to lose weight in the past including:   Reduced calorie diet, prescription medications, exercise and previous weight loss surgery (band).       Patient has lost up to 70 lbs on diets, but was unable to maintain this long term.        Diet History   Patient is eating 3 meals and 2-3 snacks daily.       24 Hour Recall   Breakfast: Grits, a boiled egg, biscuit, 2-3 strips whitten (1/2 portion)    Lunch: Bologna sandwich    Dinner: Spaghetti or milan steak on bun    Snacks: Candy    Beverages: Pierre-Aid, sweet tea, Mountain Dew, juice        Eating out: 0-1 times per week    Vitamins/supplements: None    Dietary restrictions: NKFA      Patient identifies problem areas to be:   Physical hunger, over consumption and inactivity.      Physical Activity   Work-related activity: Sedentary (wheelchair as mobility aid)    Planned exercise: None    Barriers to activity: Shortness of breath, limited mobility     DIAGNOSIS   .  Nutrition problem statement: Obesity related to multifactorial biochemical, behavioral and environmental contributors to disease as evidenced by BMI 65.20 kg/m².    INTERVENTION    Pre and post op nutrition education and coaching for behavior change completed. Program materials provided. Emphasized the importance of taking in at least 70 g protein and 64 oz fluid daily. Discussed personal habits and lifestyle behaviors that may influence weight loss efforts. Self monitoring strategies such as keeping a food journal were encouraged. Patient verbalized understanding and questions were answered.    Patient will be evaluated by multidisciplinary team before undergoing a review of their candidacy. Additional nutrition counseling available and encouraged both pre and post op. Patient demonstrated a good comprehension of diet requirements and commitment to make healthy changes.     Nicolasa Clemons appears to be a suitable candidate for bariatric surgery from a nutritional  standpoint.    MONITORING & EVALUATION    Short term goals:   Decrease/eliminate carbonated and high calorie beverages   Prioritize lean protein with meals       Total time spent during this encounter today was 30 minutes and includes preparing for the visit, reviewing tests, counseling and educating the patient/family/caregiver and documenting information in the medical record.    Electronically signed by:  Karley Arreola RD   07/30/24 13:00 EDT

## 2024-07-30 NOTE — PROGRESS NOTES
[facility] BARIATRIC [facility] BARIATRIC SURGERY  [address]  [facility] [id number] [phone]  [address]  [facility] [id number] [phone]  Dept: [phone]  7/30/2024      Nicolasa Clemons.  [id number]  [id number]  1970  female      Chief Complaint of weight gain; unable to maintain weight loss    History of Present Illness:   Nicolasa is a 53 y.o. female who presents today for evaluation, education and consultation regarding weight loss surgery. The patient is interested in the sleeve gastrectomy conversion; s/p band removal by O 4/17/24 ([facility]).      Diet History:Nicolasa has been overweight for at least 30 years, has been 35 pounds or more overweight for at least 25 years, has been 100 pounds or more overweight for 25 or more years.  The most weight Nicolasa lost was 70 pounds on lapband and maintained the weight loss for 1 year. Nicolasa describes her eating habits as sweets eater. Nicolasa Clemons has tried reduced calorie, medications, and previous weight loss surgery among others with success of losing up to 70 pounds, but in each instance regained the weight.    See dietician documentation for complete history.    Bariatric Surgery Evaluation: The patient is being seen for an initial visit for bariatric surgery evaluation.     Bariatric Co-morbidities:  sleep apnea, diabetes, hypertension, dyslipidemia, cardiovascular disease, knee pain, depression, and tobacco use    Patient Active Problem List   Diagnosis   • Foot pain   • Muscle pain   • Anxiety and depression   • Essential hypertension   • Congestive heart failure   • Abdominal pain   • Gastroesophageal reflux disease   • History of removal of laparoscopic gastric banding device   • Diabetes mellitus type 2 in obese   • Coronary artery disease involving native coronary artery of native heart without angina pectoris   • Acute superficial gastritis without hemorrhage   • Morbid obesity  with BMI of 60.0-69.9, adult   • Dietary counseling   • Pre-op evaluation   • Vitamin D deficiency   • Stage 2 chronic kidney disease   • RONNELL (obstructive sleep apnea)   • Oxygen dependent   • Iron deficiency anemia   • Acute on chronic combined systolic and diastolic heart failure   • Pulmonary hypertension   • COPD (chronic obstructive pulmonary disease)   • Irregular menstrual cycle   • Multiple joint pain       Past Medical History:   Diagnosis Date   • Anemia    • Anxiety    • CHF (congestive heart failure)    • Depression    • Diabetes mellitus    • Emphysema of lung    • GERD (gastroesophageal reflux disease)    • History of irregular heartbeat    • History of transfusion    • Hypertension    • Left knee pain    • Myocardial infarct, old    • Neuropathy    • Sleep apnea     NO MACHINE       Past Surgical History:   Procedure Laterality Date   •  SECTION     • ENDOSCOPY N/A 2024    Procedure: ESOPHAGOGASTRODUODENOSCOPY WITH BIOPSY;  Surgeon: Jose Marte Jr., MD;  Location: [facility] ENDOSCOPY;  Service: General;  Laterality: N/A;  PRE- DYSPHAGIA, H/O BAND  POST- SAME, GASTRITIS   • GASTRIC BANDING REMOVAL N/A 2024    Procedure: Adjustable gastric banding removal LAPAROSCOPIC ,LYSIS OF ADHESIONS,PORT REMOVAL;  Surgeon: Jose Marte Jr., MD;  Location: [facility] [place] [facility] [place];  Service: General;  Laterality: N/A;   • KNEE SURGERY Left    • LAPAROSCOPIC GASTRIC BANDING      dr yung   • TUBAL ABDOMINAL LIGATION         Allergies   Allergen Reactions   • Hydralazine Anaphylaxis     HIVES   • Lisinopril Anaphylaxis     HIVES         Current Outpatient Medications:   •  Lyrica 25 MG capsule, Take  by mouth., Disp: , Rfl:   •  sacubitril-valsartan (ENTRESTO)  MG tablet, Take 1 tablet by mouth., Disp: , Rfl:   •  vitamin D (ERGOCALCIFEROL) 1.25 MG (62289 UT) capsule capsule, , Disp: , Rfl:   •  albuterol (PROVENTIL) (2.5 MG/3ML) 0.083% nebulizer solution, Take 2.5 mg by  nebulization Every 4 (Four) Hours As Needed., Disp: , Rfl:   •  atorvastatin (LIPITOR) 40 MG tablet, Take 1 tablet by mouth Daily., Disp: , Rfl:   •  bumetanide (BUMEX) 2 MG tablet, Take 2 tablets by mouth Daily., Disp: , Rfl:   •  Dulaglutide (Trulicity) 0.75 MG/0.5ML solution pen-injector, Inject  under the skin into the appropriate area as directed 1 (One) Time Per Week.  MONDAYS- LAST DOSE 4/8/24, Disp: , Rfl:   •  fluticasone (FLOVENT HFA) 110 MCG/ACT inhaler, 1 puff Every 12 (Twelve) Hours., Disp: , Rfl:   •  HYDROcodone-acetaminophen (NORCO)  MG per tablet, Take 1 tablet by mouth Every 4 (Four) to 6 (Six) Hours As Needed for Pain., Disp: , Rfl:   •  insulin glargine (LANTUS, SEMGLEE) 100 UNIT/ML injection, Inject 45 Units under the skin into the appropriate area as directed Every Night., Disp: , Rfl:   •  losartan (COZAAR) 100 MG tablet, Take 1 tablet by mouth Every Night. HOLD 24 HOURS PRIOR TO SURGERY, Disp: , Rfl:   •  metOLazone (ZAROXOLYN) 2.5 MG tablet, Take 1 tablet by mouth Daily., Disp: , Rfl:   •  metoprolol tartrate (LOPRESSOR) 25 MG tablet, Take 1 tablet by mouth 2 (Two) Times a Day., Disp: , Rfl:   •  O2 (OXYGEN), Inhale 3 L/min Continuous., Disp: , Rfl:   •  SPIRONOLACTONE PO, Take  by mouth., Disp: , Rfl:     Social History     Socioeconomic History   • Marital status: Single   • Number of children: 4   Tobacco Use   • Smoking status: Some Days     Types: Cigarettes   • Smokeless tobacco: Never   Vaping Use   • Vaping status: Never Used   Substance and Sexual Activity   • Alcohol use: Not Currently   • Drug use: Not Currently     Types: Marijuana   • Sexual activity: Defer       Family History   Problem Relation Age of Onset   • Malig Hyperthermia Neg Hx          Review of Systems:  Review of Systems   Respiratory:  Positive for shortness of breath.    Musculoskeletal:  Positive for arthralgias and gait problem.   All other systems reviewed and are negative.    Physical Exam:  Vital  Signs:  Weight: (!) 167 kg (368 lb)   Body mass index is 65.2 kg/m².  Temp: 98 °F (36.7 °C)   Heart Rate: 101   BP: 134/70     Physical Exam  Vitals reviewed.   Constitutional:       Appearance: Normal appearance. She is well-developed. She is obese.   HENT:      Head: Normocephalic and atraumatic.   Cardiovascular:      Rate and Rhythm: Normal rate.   Pulmonary:      Effort: Pulmonary effort is normal.      Breath sounds: Wheezing present.   Abdominal:      General: Bowel sounds are normal. There is no distension.      Palpations: Abdomen is soft.      Tenderness: There is no abdominal tenderness.   Musculoskeletal:         General: Normal range of motion.   Skin:     General: Skin is warm and dry.   Neurological:      Mental Status: She is alert and oriented to person, place, and time.   Psychiatric:         Behavior: Behavior normal.         Thought Content: Thought content normal.         Judgment: Judgment normal.        Assessment:         Nicolasa Clemons is a 53 y.o. year old female with medically complicated severe obesity. Weight: (!) 167 kg (368 lb), Body mass index is 65.2 kg/m². and weight related problems including sleep apnea, diabetes, hypertension, dyslipidemia, cardiovascular disease, knee pain, edema, and depression.    I explained in detail, potential surgical options of interest to the patient including the RNY gastric bypass, sleeve gastrectomy, and gastric band while considering the patient's medical history. At this time, the patient expressed interest in the sleeve gastrectomy.  All of those procedures can be performed laparoscopically but there is a chance to convert to open if any technical challenges or complications do occur.  Bariatric surgery is not cosmetic surgery but rather a tool to help a patient make a life-long commitment lifestyle changes including diet, exercise, behavior changes, and taking supplemental vitamins and minerals. The risks, benefits, alternatives, and potential  complications of all of the procedures were explained in detail including but not limited to failure to lose weight or gain weight and change in body image, metabolic complications. The patient was informed that surgery is a tool and requires lifestyle change including dietary modification to be successful. The patient was made aware of the need for vitamin supplementation after surgery due to the risk of deficiencies including but not limited to calcium, thiamine, vitamin B12, folate, iron, and anemia.    The patient was advised to start a high protein, low fat and low carbohydrate diet. The patient was given individualized information by our dietician along with handouts. The patient was encouraged to start routine exercise including but not limited to 150 minutes per week. The patient received a resistance band along with a handout of exercises.     The patient was given information regarding the IZA educational video. IZA is an internet based educational video which explains the surgical procedure and answers basic questions regarding the procedure. The patient was provided with instructions and a password to watch the video.    Due to the patient's BMI, history and co-morbidities they are at a high risk for surgery and will obtain the following:  -The patient has been advised that a letter of medical support and a history and physical must be obtained from her primary care physician. The patient was given a copy of a sample form, that will suffice as their letter to take to their primary are provider.  -A referral for pre-operative psychological evaluation was ordered for the patient to evaluate candidacy as well as provide mental health support, should it be warranted before or after surgery.  -Pre-operative testing will be ordered to include: CBC, CMP,TSH and HgbA1C will be drawn, CXR, EKG. Previous results of testing were reviewed including EGD and pathology. Discussed with the patient the importance of  smoking cessation, the time frame for quitting and the risks involved with smoking after bariatric surgery; patient understands they will be tested prior to surgery to verify cessation.    Nicolasa Clemons will obtain a pre-operative clearance from her cardiologist and pulmonologist prior to surgery.     The patient understands the surgical procedures and the different surgical options that are available.  She understands the lifestyle changes that would be required after surgery and has agreed to participate in a pre-operative and postoperative weight management program.  She also expressed understanding of possible risks, had several questions answered and desires to proceed.    I think she is a good candidate for this surgery, and is interested in a sleeve gastrectomy.    Encounter Diagnoses   Name Primary?   • Morbid obesity with BMI of 60.0-69.9, adult Yes   • Diabetes mellitus type 2 in obese    • Essential hypertension    • RONNELL (obstructive sleep apnea)    • Pre-op evaluation        Plan:    The consultation plan was reviewed with the patient.  Patient will have evaluations and follow up with bariatric dieticians and a psychologist before undergoing a multidisciplinary review of her candidacy.  We also discussed the weight loss requirement and rationale, as well as other program requirements to ensure the safest approach to surgery. We spent time discussing different surgical procedures and plan of care throughout their lifespan to ensure long term success in achieving and maintaining a healthier weight. Patient will proceed with preoperative lab work, radiology, and endoscopy after obtaining agreed upon clearances and letter of support from their primary care provider.     As this patient is a female of childbearing age, she was counseled regarding conception and pregnancy after surgery. Patient was advised that conception and pregnancy in the first 18 months post surgery is not advised due to increased  metabolic demands required during pregnancy as well as increased risk of nutrient and vitamin deficiencies which could jeopardize fetal development and birth weight. S/p tubal    Total encounter exceeded 40+ minutes including reviewing their chart/outside medical records/previous visits, face to face time obtaining medical history and physical, reviewing surgical options and answering any questions, discussing pre-operative plan and requirements along with care coordination.         Minda Rodrigues, APRN  7/30/2024

## 2024-07-30 NOTE — PATIENT INSTRUCTIONS
Recommended patient be sure to get at least 70 grams of protein per day by eating small, frequent meals all with high lean protein choices. Be sure to limit/cut back on daily carbohydrate intake. Discussed with the patient the recommended amount of water per day to intake. Reviewed vitamin requirements. Be sure to do routine exercise including both cardio and strength training.  Steps to Quit Smoking  Smoking tobacco is the leading cause of preventable death. It can affect almost every organ in the body. Smoking puts you and those around you at risk for developing many serious chronic diseases.  Quitting smoking can be very challenging. Do not get discouraged if you are not successful the first time. Some people need to make many attempts to quit before they achieve long-term success. Do your best to stick to your quit plan, and talk with your health care provider if you have any questions or concerns.  How do I get ready to quit?  When you decide to quit smoking, create a plan to help you succeed. Before you quit:  Pick a date to quit. Set a date within the next 2 weeks to give you time to prepare.  Write down the reasons why you are quitting. Keep this list in places where you will see it often.  Tell your family, friends, and co-workers that you are quitting. Support from people you are close to can make quitting easier.  Talk with your health care provider about your options for quitting smoking.  Find out what treatment options are covered by your health insurance.  Identify people, places, things, and activities that make you want to smoke (triggers). Avoid them.  What first steps can I take to quit smoking?  Throw away all cigarettes at home, at work, and in your car.  Throw away smoking accessories, such as ashtrays and lighters.  Clean your car. Make sure to empty the ashtray.  Clean your home, including curtains and carpets.  What strategies can I use to quit smoking?  Talk with your health care provider  about combining strategies, such as taking medicines while you are also receiving in-person counseling. Using these two strategies together makes you more likely to succeed in quitting than if you used either strategy on its own.  If you are pregnant or breastfeeding, talk with your health care provider about finding counseling or other support strategies to quit smoking. Do not take medicine to help you quit smoking unless your health care provider tells you to.  Quit right away  Quit smoking completely, instead of gradually reducing how much you smoke over a period of time. Stopping smoking right away may be more successful than gradually quitting.  Attend in-person counseling to help you build problem-solving skills. You are more likely to succeed in quitting if you attend counseling sessions regularly. Even short sessions of 10 minutes can be effective.  Take medicine  You may take medicines to help you quit smoking. Some medicines require a prescription. You can also purchase over-the-counter medicines. Medicines may have nicotine in them to replace the nicotine in cigarettes. Medicines may:  Help to stop cravings.  Help to relieve withdrawal symptoms.  Your health care provider may recommend:  Nicotine patches, gum, or lozenges.  Nicotine inhalers or sprays.  Non-nicotine medicine that you take by mouth.  Find resources  Find resources and support systems that can help you quit smoking and remain smoke-free after you quit. These resources are most helpful when you use them often. They include:  Online chats with a counselor.  Telephone quitlines.  Printed self-help materials.  Support groups or group counseling.  Text messaging programs.  Mobile phone apps or applications. Use apps that can help you stick to your quit plan by providing reminders, tips, and encouragement. Examples of free services include Quit Guide from the CDC and smokefree.gov    What can I do to make it easier to quit?    Reach out to your  family and friends for support and encouragement. Call telephone quitlines, such as 7-800-QUIT-NOW, reach out to support groups, or work with a counselor for support.  Ask people who smoke to avoid smoking around you.  Avoid places that trigger you to smoke, such as bars, parties, or smoke-break areas at work.  Spend time with people who do not smoke.  Lessen the stress in your life. Stress can be a smoking trigger for some people. To lessen stress, try:  Exercising regularly.  Doing deep-breathing exercises.  Doing yoga.  Meditating.  What benefits will I see if I quit smoking?  Over time, you should start to see positive results, such as:  Improved sense of smell and taste.  Decreased coughing and sore throat.  Slower heart rate.  Lower blood pressure.  Clearer and healthier skin.  The ability to breathe more easily.  Fewer sick days.  Summary  Quitting smoking can be very challenging. Do not get discouraged if you are not successful the first time. Some people need to make many attempts to quit before they achieve long-term success.  When you decide to quit smoking, create a plan to help you succeed.  Quit smoking right away, not slowly over a period of time.  Find resources and support systems that can help you quit smoking and remain smoke-free after you quit.  This information is not intended to replace advice given to you by your health care provider. Make sure you discuss any questions you have with your health care provider.  Document Revised: 12/09/2022 Document Reviewed: 12/09/2022  [employer] Patient Education © 2024 [employer]

## 2024-08-30 ENCOUNTER — LAB (OUTPATIENT)
Dept: LAB | Facility: HOSPITAL | Age: 54
End: 2024-08-30
Payer: MEDICAID

## 2024-08-30 ENCOUNTER — OFFICE VISIT (OUTPATIENT)
Dept: BARIATRICS/WEIGHT MGMT | Facility: CLINIC | Age: 54
End: 2024-08-30
Payer: MEDICAID

## 2024-08-30 ENCOUNTER — HOSPITAL ENCOUNTER (OUTPATIENT)
Dept: GENERAL RADIOLOGY | Facility: HOSPITAL | Age: 54
Discharge: HOME | End: 2024-08-30
Payer: MEDICAID

## 2024-08-30 VITALS
HEART RATE: 86 BPM | HEIGHT: 63 IN | TEMPERATURE: 98.3 F | SYSTOLIC BLOOD PRESSURE: 140 MMHG | WEIGHT: 293 LBS | BODY MASS INDEX: 51.91 KG/M2 | DIASTOLIC BLOOD PRESSURE: 90 MMHG

## 2024-08-30 DIAGNOSIS — E66.9 TYPE 2 DIABETES MELLITUS WITH OBESITY: ICD-10-CM

## 2024-08-30 DIAGNOSIS — G47.33 OSA (OBSTRUCTIVE SLEEP APNEA): ICD-10-CM

## 2024-08-30 DIAGNOSIS — I50.9 CONGESTIVE HEART FAILURE, UNSPECIFIED HF CHRONICITY, UNSPECIFIED HEART FAILURE TYPE: ICD-10-CM

## 2024-08-30 DIAGNOSIS — E66.01 MORBID OBESITY WITH BMI OF 60.0-69.9, ADULT: ICD-10-CM

## 2024-08-30 DIAGNOSIS — I10 ESSENTIAL HYPERTENSION: ICD-10-CM

## 2024-08-30 DIAGNOSIS — Z01.818 PRE-OP EVALUATION: ICD-10-CM

## 2024-08-30 DIAGNOSIS — E66.01 MORBID OBESITY WITH BMI OF 60.0-69.9, ADULT: Primary | ICD-10-CM

## 2024-08-30 DIAGNOSIS — Z98.84 HISTORY OF REMOVAL OF LAPAROSCOPIC GASTRIC BANDING DEVICE: ICD-10-CM

## 2024-08-30 DIAGNOSIS — E11.69 TYPE 2 DIABETES MELLITUS WITH OBESITY: ICD-10-CM

## 2024-08-30 DIAGNOSIS — Z99.81 OXYGEN DEPENDENT: ICD-10-CM

## 2024-08-30 PROBLEM — J96.11 CHRONIC RESPIRATORY FAILURE WITH HYPOXIA: Status: ACTIVE | Noted: 2024-08-26

## 2024-08-30 PROBLEM — I50.23 ACUTE ON CHRONIC SYSTOLIC HEART FAILURE: Status: ACTIVE | Noted: 2024-08-30

## 2024-08-30 LAB
ALBUMIN SERPL-MCNC: 3.7 G/DL (ref 3.5–5.2)
ALBUMIN/GLOB SERPL: 1.2 G/DL
ALP SERPL-CCNC: 107 U/L (ref 39–117)
ALT SERPL W P-5'-P-CCNC: 12 U/L (ref 1–33)
ANION GAP SERPL CALCULATED.3IONS-SCNC: 13.6 MMOL/L (ref 5–15)
AST SERPL-CCNC: 6 U/L (ref 1–32)
BASOPHILS # BLD AUTO: 0.02 10*3/MM3 (ref 0–0.2)
BASOPHILS NFR BLD AUTO: 0.4 % (ref 0–1.5)
BILIRUB SERPL-MCNC: 0.8 MG/DL (ref 0–1.2)
BUN SERPL-MCNC: 9 MG/DL (ref 6–20)
BUN/CREAT SERPL: 8.2 (ref 7–25)
CALCIUM SPEC-SCNC: 9.3 MG/DL (ref 8.6–10.5)
CHLORIDE SERPL-SCNC: 97 MMOL/L (ref 98–107)
CO2 SERPL-SCNC: 27.4 MMOL/L (ref 22–29)
CREAT SERPL-MCNC: 1.1 MG/DL (ref 0.57–1)
DEPRECATED RDW RBC AUTO: 43.9 FL (ref 37–54)
EGFRCR SERPLBLD CKD-EPI 2021: 61 ML/MIN/1.73
EOSINOPHIL # BLD AUTO: 0.28 10*3/MM3 (ref 0–0.4)
EOSINOPHIL NFR BLD AUTO: 5.4 % (ref 0.3–6.2)
ERYTHROCYTE [DISTWIDTH] IN BLOOD BY AUTOMATED COUNT: 15.8 % (ref 12.3–15.4)
GLOBULIN UR ELPH-MCNC: 3.1 GM/DL
GLUCOSE SERPL-MCNC: 184 MG/DL (ref 65–99)
HBA1C MFR BLD: 9.9 % (ref 4.8–5.6)
HCT VFR BLD AUTO: 39.7 % (ref 34–46.6)
HGB BLD-MCNC: 12.3 G/DL (ref 12–15.9)
IMM GRANULOCYTES # BLD AUTO: 0.02 10*3/MM3 (ref 0–0.05)
IMM GRANULOCYTES NFR BLD AUTO: 0.4 % (ref 0–0.5)
LYMPHOCYTES # BLD AUTO: 2.19 10*3/MM3 (ref 0.7–3.1)
LYMPHOCYTES NFR BLD AUTO: 42.1 % (ref 19.6–45.3)
MCH RBC QN AUTO: 24.7 PG (ref 26.6–33)
MCHC RBC AUTO-ENTMCNC: 31 G/DL (ref 31.5–35.7)
MCV RBC AUTO: 79.7 FL (ref 79–97)
MONOCYTES # BLD AUTO: 0.53 10*3/MM3 (ref 0.1–0.9)
MONOCYTES NFR BLD AUTO: 10.2 % (ref 5–12)
NEUTROPHILS NFR BLD AUTO: 2.16 10*3/MM3 (ref 1.7–7)
NEUTROPHILS NFR BLD AUTO: 41.5 % (ref 42.7–76)
NRBC BLD AUTO-RTO: 0 /100 WBC (ref 0–0.2)
PLATELET # BLD AUTO: 219 10*3/MM3 (ref 140–450)
PMV BLD AUTO: 10.7 FL (ref 6–12)
POTASSIUM SERPL-SCNC: 3.7 MMOL/L (ref 3.5–5.2)
PROT SERPL-MCNC: 6.8 G/DL (ref 6–8.5)
RBC # BLD AUTO: 4.98 10*6/MM3 (ref 3.77–5.28)
SODIUM SERPL-SCNC: 138 MMOL/L (ref 136–145)
TSH SERPL DL<=0.05 MIU/L-ACNC: 2.17 UIU/ML (ref 0.27–4.2)
WBC NRBC COR # BLD AUTO: 5.2 10*3/MM3 (ref 3.4–10.8)

## 2024-08-30 PROCEDURE — 71046 X-RAY EXAM CHEST 2 VIEWS: CPT

## 2024-08-30 PROCEDURE — 85025 COMPLETE CBC W/AUTO DIFF WBC: CPT | Performed by: NURSE PRACTITIONER

## 2024-08-30 PROCEDURE — 83036 HEMOGLOBIN GLYCOSYLATED A1C: CPT | Performed by: NURSE PRACTITIONER

## 2024-08-30 PROCEDURE — 80053 COMPREHEN METABOLIC PANEL: CPT | Performed by: NURSE PRACTITIONER

## 2024-08-30 PROCEDURE — 84443 ASSAY THYROID STIM HORMONE: CPT | Performed by: NURSE PRACTITIONER

## 2024-08-30 RX ORDER — BLOOD-GLUCOSE METER
EACH MISCELLANEOUS
COMMUNITY
Start: 2024-08-06

## 2024-08-30 RX ORDER — PEN NEEDLE, DIABETIC 32GX 5/32"
NEEDLE, DISPOSABLE MISCELLANEOUS
COMMUNITY
Start: 2024-08-06

## 2024-08-30 RX ORDER — GLUCOSAMINE HCL/CHONDROITIN SU 500-400 MG
1 CAPSULE ORAL 3 TIMES DAILY
COMMUNITY
Start: 2024-08-06

## 2024-08-30 RX ORDER — COLCHICINE 0.6 MG/1
TABLET ORAL
COMMUNITY

## 2024-08-30 RX ORDER — LANCETS 33 GAUGE
EACH MISCELLANEOUS
COMMUNITY
Start: 2024-08-06

## 2024-08-30 RX ORDER — GABAPENTIN 300 MG/1
CAPSULE ORAL
COMMUNITY

## 2024-08-30 RX ORDER — GABAPENTIN 100 MG/1
CAPSULE ORAL
COMMUNITY

## 2024-08-30 RX ORDER — PREDNISONE 10 MG/1
TABLET ORAL
COMMUNITY
Start: 2024-08-06

## 2024-08-30 RX ORDER — INSULIN LISPRO 100 [IU]/ML
INJECTION, SOLUTION INTRAVENOUS; SUBCUTANEOUS
COMMUNITY
Start: 2024-08-06

## 2024-08-30 RX ORDER — AMMONIUM LACTATE 12 G/100G
LOTION TOPICAL
COMMUNITY

## 2024-08-30 RX ORDER — DOXYCYCLINE 100 MG/1
CAPSULE ORAL
COMMUNITY

## 2024-08-30 RX ORDER — LORAZEPAM 0.5 MG/1
TABLET ORAL
COMMUNITY

## 2024-08-30 NOTE — PROGRESS NOTES
[facility]  [address]  [place] [id number] [phone]  [address]  [place] [id number] [phone]  Dept: [phone]  8/30/2024      Nicolasa Clemons.  [id number]  [id number]  1970  female      CC: diet visit BMI over 50    The patient is here for their monthly pre-operative physician supervised diet. Today's weight is (!) 171 kg (378 lb) pounds and had a gain of 10 lbs. The patient states that she is following the recommendations given by our office and dietician including a high lean protein, low carb and low fat diet. We recommended adequate fruits and vegetable intake along with limited portion sizes. Patient is working on eliminating fast foods, fried foods, sweets and soda.    Pt recently in hospital 2 weeks ago for pneumonia.  Pt currently on 3L NC O2.    The patient admits to be struggling with water intake.    Review of Systems   Constitutional:  Positive for appetite change. Negative for fatigue and unexpected weight change.   HENT: Negative.     Eyes: Negative.    Respiratory: Negative.     Cardiovascular: Negative.  Negative for leg swelling.   Gastrointestinal:  Negative for abdominal distention, abdominal pain, constipation, diarrhea, nausea and vomiting.   Genitourinary:  Negative for difficulty urinating, frequency and urgency.   Musculoskeletal:  Negative for back pain.   Skin: Negative.    Psychiatric/Behavioral: Negative.     All other systems reviewed and are negative.    Vitals:    08/30/24 0945   BP: 140/90   Pulse: 86   Temp: 98.3 °F (36.8 °C)     Patient Active Problem List   Diagnosis    Foot pain    Muscle pain    Anxiety and depression    Essential hypertension    Congestive heart failure    Abdominal pain    Gastroesophageal reflux disease    History of removal of laparoscopic gastric banding device    Diabetes mellitus type 2 in obese    Coronary artery disease involving native  coronary artery of native heart without angina pectoris    Acute superficial gastritis without hemorrhage    Morbid obesity with BMI of 60.0-69.9, adult    Dietary counseling    Pre-op evaluation    Vitamin D deficiency    Stage 2 chronic kidney disease    RONNELL (obstructive sleep apnea)    Oxygen dependent    Iron deficiency anemia    Acute on chronic combined systolic and diastolic heart failure    Pulmonary hypertension    COPD (chronic obstructive pulmonary disease)    Irregular menstrual cycle    Multiple joint pain    Chronic respiratory failure with hypoxia    Acute on chronic systolic heart failure     Body mass index is 66.98 kg/m².    The following portions of the patient's history were reviewed and updated as appropriate: active problem list, medication list, allergies, notes from last encounter    Physical Exam  Vitals reviewed.   Constitutional:       Appearance: Normal appearance.   HENT:      Head: Normocephalic and atraumatic.   Eyes:      Pupils: Pupils are equal, round, and reactive to light.   Cardiovascular:      Rate and Rhythm: Normal rate.   Pulmonary:      Effort: No respiratory distress.      Comments: On 3L NC O2  Musculoskeletal:      Cervical back: Normal range of motion and neck supple.      Comments: Using walker for ambulation   Neurological:      General: No focal deficit present.      Mental Status: She is alert and oriented to person, place, and time.   Psychiatric:         Mood and Affect: Mood normal.         Behavior: Behavior normal.         Discussion/Plan:  Advised pt to work on increasing water intake daily.  Discussed implementing 1 meal replacement shake per day.    Obesity/Morbid Obesity: Currently the patient's weight is increasing. There are no medications prescribed.Treatment plan includes prescribed diet, prescribed exercise regimen and behavior modification.    I reviewed the appropriate dietary choices with the patient and encouraged the necessary changes. Recommended  at least 70 grams of protein per day, around 35 grams of fats and less than 100 grams of carbohydrates. Reviewed calorie intake if patient wanted to calorie count and/or had BMR. Instructed patient to drink half of body weight in ounces per day and exercise a minimum of 150 minutes per week including both cardio and strength training. Discussed the option of keeping a food journal which will help patient become more aware of the nutritional value of foods so they are more prepared after surgery.    The patient was given written materials from our office for education.   I answered all of the patients questions regarding dietary changes, exercise or surgical options.  The patient will follow up in 1 month. The total time spent face to face was approximately 20 minutes.     Encounter Diagnoses   Name Primary?    Morbid obesity with BMI of 60.0-69.9, adult Yes    History of removal of laparoscopic gastric banding device     Congestive heart failure, unspecified HF chronicity, unspecified heart failure type     Essential hypertension     Oxygen dependent

## 2024-09-05 ENCOUNTER — TELEPHONE (OUTPATIENT)
Dept: BARIATRICS/WEIGHT MGMT | Facility: CLINIC | Age: 54
End: 2024-09-05
Payer: MEDICAID

## 2024-09-05 NOTE — TELEPHONE ENCOUNTER
Tried to call patient to inform her about A1c levels, but it goes straight to voicemail and is full    ----- Message from Minda Ravi sent at 9/3/2024  9:55 AM EDT -----  Glucose needs to stay managed prior to surgery. She is right on the borderline with her A1c. Thx

## (undated) DEVICE — LN SMPL CO2 SHTRM SD STREAM W/M LUER

## (undated) DEVICE — KT ORCA ORCAPOD DISP STRL

## (undated) DEVICE — DISPOSABLE MONOPOLAR ENDOSCOPIC CORD 10 FT. (3M): Brand: KIRWAN

## (undated) DEVICE — CONTROL SYRINGE LUER-LOCK TIP: Brand: MONOJECT

## (undated) DEVICE — ENDOPATH XCEL UNIVERSAL TROCAR STABLILITY SLEEVES: Brand: ENDOPATH XCEL

## (undated) DEVICE — PATIENT RETURN ELECTRODE, SINGLE-USE, CONTACT QUALITY MONITORING, ADULT, WITH 9FT CORD, FOR PATIENTS WEIGING OVER 33LBS. (15KG): Brand: MEGADYNE

## (undated) DEVICE — ENDOPATH XCEL BLADELESS TROCARS WITH STABILITY SLEEVES: Brand: ENDOPATH XCEL

## (undated) DEVICE — APPL CHLORAPREP HI/LITE 26ML ORNG

## (undated) DEVICE — BLCK/BITE BLOX WO/DENTL/RIM W/STRAP 54F

## (undated) DEVICE — MINI ENDOCUT SCISSOR TIP, DISPOSABLE: Brand: RENEW

## (undated) DEVICE — SUT VIC 2/0 SH 27IN

## (undated) DEVICE — TROCAR: Brand: KII OPTICAL ACCESS SYSTEM

## (undated) DEVICE — GLV SURG SENSICARE PI PF LF 8.5 GRN STRL

## (undated) DEVICE — GLV SURG SENSICARE PI MIC PF SZ8.5 LF STRL

## (undated) DEVICE — TBG PENCL TELESCP MEGADYNE SMOKE EVAC 10FT

## (undated) DEVICE — GLV SURG SENSICARE PI MIC PF SZ6 LF STRL

## (undated) DEVICE — NDL HYPO PRECISIONGLIDE REG 21G 1 1/2

## (undated) DEVICE — DECANTER BAG 9": Brand: MEDLINE INDUSTRIES, INC.

## (undated) DEVICE — ADHS SKIN SURG TISS VISC PREMIERPRO EXOFIN HI/VISC FAST/DRY

## (undated) DEVICE — MSK PROC CURAPLEX O2 2/ADAPT 7FT

## (undated) DEVICE — TUBING, SUCTION, 1/4" X 10', STRAIGHT: Brand: MEDLINE

## (undated) DEVICE — 2, DISPOSABLE SUCTION/IRRIGATOR WITH DISPOSABLE TIP: Brand: STRYKEFLOW

## (undated) DEVICE — BLCK/BITE BLOX W/DENTL/RIM W/STRAP 54F

## (undated) DEVICE — ADAPT CLN BIOGUARD AIR/H2O DISP

## (undated) DEVICE — PK BARIATRIC 10 40 70

## (undated) DEVICE — ENDOPATH XCEL WITH OPTIVIEW TECHNOLOGY BLADELESS TROCARS WITH STABILITY SLEEVES: Brand: ENDOPATH XCEL OPTIVIEW

## (undated) DEVICE — SUT MNCRYL 4/0 PS2 18 IN

## (undated) DEVICE — FRCP BX RADJAW4 NDL 2.8 240CM LG OG BX40

## (undated) DEVICE — LAPAROSCOPIC SMOKE FILTRATION SYSTEM: Brand: PALL LAPAROSHIELD® PLUS LAPAROSCOPIC SMOKE FILTRATION SYSTEM

## (undated) DEVICE — LAPAROVUE VISIBILITY SYSTEM LAPAROSCOPIC SOLUTIONS: Brand: LAPAROVUE

## (undated) DEVICE — SENSR O2 OXIMAX FNGR A/ 18IN NONSTR

## (undated) DEVICE — GLV SURG SENSICARE POLYISPRN W/ALOE PF LF 6.5 GRN STRL

## (undated) DEVICE — CONTAINER,SPECIMEN,OR STERILE,4OZ: Brand: MEDLINE

## (undated) DEVICE — DEV COND GAS LAP INSUFLOW W/LUER CONN